# Patient Record
Sex: MALE | Race: WHITE | URBAN - METROPOLITAN AREA
[De-identification: names, ages, dates, MRNs, and addresses within clinical notes are randomized per-mention and may not be internally consistent; named-entity substitution may affect disease eponyms.]

---

## 2023-09-13 RX ORDER — CEFEPIME 1 G/1
2 INJECTION, POWDER, FOR SOLUTION INTRAMUSCULAR; INTRAVENOUS
Refills: 0 | DISCHARGE
Start: 2023-09-13

## 2023-09-20 ENCOUNTER — INPATIENT (INPATIENT)
Facility: HOSPITAL | Age: 73
LOS: 7 days | Discharge: NURSING, REHAB OR LONG-TERM CARE | DRG: 871 | End: 2023-09-28
Attending: INTERNAL MEDICINE | Admitting: INTERNAL MEDICINE
Payer: MEDICARE

## 2023-09-20 VITALS
OXYGEN SATURATION: 99 % | TEMPERATURE: 98 F | SYSTOLIC BLOOD PRESSURE: 99 MMHG | DIASTOLIC BLOOD PRESSURE: 73 MMHG | RESPIRATION RATE: 16 BRPM | HEART RATE: 114 BPM

## 2023-09-20 LAB
ALBUMIN SERPL ELPH-MCNC: 1.7 G/DL — LOW (ref 3.3–5)
ALP SERPL-CCNC: 129 U/L — HIGH (ref 40–120)
ALT FLD-CCNC: 57 U/L — SIGNIFICANT CHANGE UP (ref 12–78)
ANION GAP SERPL CALC-SCNC: 6 MMOL/L — SIGNIFICANT CHANGE UP (ref 5–17)
ANISOCYTOSIS BLD QL: SLIGHT — SIGNIFICANT CHANGE UP
AST SERPL-CCNC: 36 U/L — SIGNIFICANT CHANGE UP (ref 15–37)
BASOPHILS # BLD AUTO: 0 K/UL — SIGNIFICANT CHANGE UP (ref 0–0.2)
BASOPHILS NFR BLD AUTO: 0 % — SIGNIFICANT CHANGE UP (ref 0–2)
BILIRUB SERPL-MCNC: 0.3 MG/DL — SIGNIFICANT CHANGE UP (ref 0.2–1.2)
BUN SERPL-MCNC: 75 MG/DL — HIGH (ref 7–23)
CALCIUM SERPL-MCNC: 7.9 MG/DL — LOW (ref 8.5–10.1)
CHLORIDE SERPL-SCNC: 108 MMOL/L — SIGNIFICANT CHANGE UP (ref 96–108)
CO2 SERPL-SCNC: 22 MMOL/L — SIGNIFICANT CHANGE UP (ref 22–31)
CREAT SERPL-MCNC: 2.53 MG/DL — HIGH (ref 0.5–1.3)
EGFR: 26 ML/MIN/1.73M2 — LOW
EOSINOPHIL # BLD AUTO: 0 K/UL — SIGNIFICANT CHANGE UP (ref 0–0.5)
EOSINOPHIL NFR BLD AUTO: 0 % — SIGNIFICANT CHANGE UP (ref 0–6)
GLUCOSE SERPL-MCNC: 160 MG/DL — HIGH (ref 70–99)
HCT VFR BLD CALC: 28.8 % — LOW (ref 39–50)
HGB BLD-MCNC: 9.1 G/DL — LOW (ref 13–17)
HYPOCHROMIA BLD QL: SLIGHT — SIGNIFICANT CHANGE UP
LACTATE SERPL-SCNC: 1.9 MMOL/L — SIGNIFICANT CHANGE UP (ref 0.7–2)
LIDOCAIN IGE QN: 178 U/L — HIGH (ref 13–75)
LYMPHOCYTES # BLD AUTO: 0.73 K/UL — LOW (ref 1–3.3)
LYMPHOCYTES # BLD AUTO: 5 % — LOW (ref 13–44)
MANUAL SMEAR VERIFICATION: SIGNIFICANT CHANGE UP
MCHC RBC-ENTMCNC: 29.5 PG — SIGNIFICANT CHANGE UP (ref 27–34)
MCHC RBC-ENTMCNC: 31.6 GM/DL — LOW (ref 32–36)
MCV RBC AUTO: 93.5 FL — SIGNIFICANT CHANGE UP (ref 80–100)
METAMYELOCYTES # FLD: 3 % — HIGH (ref 0–0)
MONOCYTES # BLD AUTO: 0.58 K/UL — SIGNIFICANT CHANGE UP (ref 0–0.9)
MONOCYTES NFR BLD AUTO: 4 % — SIGNIFICANT CHANGE UP (ref 2–14)
MYELOCYTES NFR BLD: 6 % — HIGH (ref 0–0)
NEUTROPHILS # BLD AUTO: 11.94 K/UL — HIGH (ref 1.8–7.4)
NEUTROPHILS NFR BLD AUTO: 75 % — SIGNIFICANT CHANGE UP (ref 43–77)
NEUTS BAND # BLD: 7 % — SIGNIFICANT CHANGE UP (ref 0–8)
NRBC # BLD: 0 /100 — SIGNIFICANT CHANGE UP (ref 0–0)
NRBC # BLD: SIGNIFICANT CHANGE UP /100 WBCS (ref 0–0)
PLAT MORPH BLD: NORMAL — SIGNIFICANT CHANGE UP
PLATELET # BLD AUTO: 460 K/UL — HIGH (ref 150–400)
POTASSIUM SERPL-MCNC: 4.5 MMOL/L — SIGNIFICANT CHANGE UP (ref 3.5–5.3)
POTASSIUM SERPL-SCNC: 4.5 MMOL/L — SIGNIFICANT CHANGE UP (ref 3.5–5.3)
PROT SERPL-MCNC: 5.8 GM/DL — LOW (ref 6–8.3)
RBC # BLD: 3.08 M/UL — LOW (ref 4.2–5.8)
RBC # FLD: 15.5 % — HIGH (ref 10.3–14.5)
RBC BLD AUTO: ABNORMAL
SODIUM SERPL-SCNC: 136 MMOL/L — SIGNIFICANT CHANGE UP (ref 135–145)
WBC # BLD: 14.56 K/UL — HIGH (ref 3.8–10.5)
WBC # FLD AUTO: 14.56 K/UL — HIGH (ref 3.8–10.5)

## 2023-09-20 PROCEDURE — 74176 CT ABD & PELVIS W/O CONTRAST: CPT | Mod: 26,ME

## 2023-09-20 PROCEDURE — G1004: CPT

## 2023-09-20 PROCEDURE — 99285 EMERGENCY DEPT VISIT HI MDM: CPT

## 2023-09-20 RX ORDER — SODIUM CHLORIDE 9 MG/ML
1000 INJECTION INTRAMUSCULAR; INTRAVENOUS; SUBCUTANEOUS ONCE
Refills: 0 | Status: COMPLETED | OUTPATIENT
Start: 2023-09-20 | End: 2023-09-20

## 2023-09-20 RX ORDER — FENTANYL CITRATE 50 UG/ML
50 INJECTION INTRAVENOUS ONCE
Refills: 0 | Status: DISCONTINUED | OUTPATIENT
Start: 2023-09-20 | End: 2023-09-20

## 2023-09-20 RX ORDER — CEFEPIME 1 G/1
1000 INJECTION, POWDER, FOR SOLUTION INTRAMUSCULAR; INTRAVENOUS ONCE
Refills: 0 | Status: DISCONTINUED | OUTPATIENT
Start: 2023-09-20 | End: 2023-09-20

## 2023-09-20 RX ORDER — MEROPENEM 1 G/30ML
1000 INJECTION INTRAVENOUS ONCE
Refills: 0 | Status: DISCONTINUED | OUTPATIENT
Start: 2023-09-20 | End: 2023-09-21

## 2023-09-20 RX ADMIN — FENTANYL CITRATE 50 MICROGRAM(S): 50 INJECTION INTRAVENOUS at 19:47

## 2023-09-20 RX ADMIN — SODIUM CHLORIDE 1000 MILLILITER(S): 9 INJECTION INTRAMUSCULAR; INTRAVENOUS; SUBCUTANEOUS at 19:48

## 2023-09-20 NOTE — ED PROVIDER NOTE - NSICDXPASTMEDICALHX_GEN_ALL_CORE_FT
PAST MEDICAL HISTORY:  CVA (cerebrovascular accident)     GERD (gastroesophageal reflux disease)     Hypotension     Metabolic encephalopathy     Rhabdomyolysis

## 2023-09-20 NOTE — ED PROVIDER NOTE - OBJECTIVE STATEMENT
72 y/o male with PMHx of CVA with left-sided hemiparesis and hemiplegia, dysphagia, and facial weakness, hypotension, rhabdomyolysis, and metabolic encephalopathy BIBEMS from [facility] to ED c/o worsening intermittent diffuse abdominal pain x 2 weeks. Pt reports he was discharged from hospital 2 weeks to [facility] rehab and since then has mike having abdominal pain. Pt with LUE midline, on antibiotics for pseudomonas infection and bacteremia since 9/13. Reports fevers at home Tmax of 104F. Had diarrhea initially but it has since improved with meds given. Denies cough. Per review of documents from [facility], pt currently receiving 2g cefepime for bacteremia.

## 2023-09-20 NOTE — ED PROVIDER NOTE - PHYSICAL EXAMINATION
GENERAL: A&Ox4, non-toxic appearing, no acute distress  HEENT: NCAT, EOMI, oral mucosa moist, normal conjunctiva  RESP: CTAB, no respiratory distress, no wheezes/rhonchi/rales, speaking in full sentences  CV: tachycardic, regular rhythm, no murmurs/rubs/gallops  ABDOMEN: soft, diffuse abdominal tenderness, non-distended, no guarding,   : freitas in place   MSK: no visible deformities  NEURO: no focal sensory or motor deficits, CN 2-12 grossly intact  SKIN: warm, normal color, well perfused, no rash  PSYCH: normal affect

## 2023-09-20 NOTE — ED PROVIDER NOTE - PROGRESS NOTE DETAILS
All labs reviewed.  Patient has leukocytosis, CKD, mildly elevated lipase, CT abdomen with mild left hydro with perinephric fat stranding concerning for Parker versus recently passed stone.  Patient also has thickening of the urinary bladder concerning for superimposed cystitis.  Patient has been on cefepime for the past few weeks, will broaden antibiotics to include meropenem.  Patient to be admitted for further management.  Discussed plan with patient and all questions answered.  Hospitalist paged for admission.

## 2023-09-20 NOTE — ED ADULT TRIAGE NOTE - CHIEF COMPLAINT QUOTE
Pt presents to ED c/o abdominal pain, arrives w/ midline to left arm- on antibiotics for pseudomonas and bacteremia started on 9/13. Pt c/o subjective fevers.

## 2023-09-20 NOTE — ED ADULT NURSE NOTE - OBJECTIVE STATEMENT
The patient is a 73y Male complaining of abdominal pain. Pt currently in rehab at [facility]. {Pt has a hx of CVA, pseudomonas infection. Pt denies CP, SOB. 20 g IV inserted labs drawn. Pt safety is maintained.

## 2023-09-20 NOTE — ED PROVIDER NOTE - CLINICAL SUMMARY MEDICAL DECISION MAKING FREE TEXT BOX
74 y/o male presents with diffuse abdominal pain also receiving PICC line IV antibiotics. Will evaluate with bloodwork, Ct abdomen and pelvis, pain control, reassess. 72 y/o male presents with diffuse abdominal pain also receiving PICC line IV antibiotics. Will evaluate with bloodwork, Ct abdomen and pelvis, pain control, reassess.    0007: Alanna MCNEIL: Signout received Dr. Wang pending admission.  Patient admitted for pyelonephritis and ID eval with antibiotics.  Signout given to hospitalist Dr. Garcia.

## 2023-09-20 NOTE — ED ADULT TRIAGE NOTE - NS ED NURSE BANDS TYPE
Name band;
Alert-The patient is alert, awake and responds to voice. The patient is oriented to time, place, and person. The triage nurse is able to obtain subjective information.

## 2023-09-21 DIAGNOSIS — D63.8 ANEMIA IN OTHER CHRONIC DISEASES CLASSIFIED ELSEWHERE: ICD-10-CM

## 2023-09-21 DIAGNOSIS — N17.9 ACUTE KIDNEY FAILURE, UNSPECIFIED: ICD-10-CM

## 2023-09-21 DIAGNOSIS — I63.9 CEREBRAL INFARCTION, UNSPECIFIED: ICD-10-CM

## 2023-09-21 DIAGNOSIS — I95.9 HYPOTENSION, UNSPECIFIED: ICD-10-CM

## 2023-09-21 DIAGNOSIS — R10.9 UNSPECIFIED ABDOMINAL PAIN: ICD-10-CM

## 2023-09-21 DIAGNOSIS — N12 TUBULO-INTERSTITIAL NEPHRITIS, NOT SPECIFIED AS ACUTE OR CHRONIC: ICD-10-CM

## 2023-09-21 DIAGNOSIS — U07.1 COVID-19: ICD-10-CM

## 2023-09-21 LAB
ADD ON TEST-SPECIMEN IN LAB: SIGNIFICANT CHANGE UP
ADD ON TEST-SPECIMEN IN LAB: SIGNIFICANT CHANGE UP
ALBUMIN SERPL ELPH-MCNC: 1.7 G/DL — LOW (ref 3.3–5)
ALP SERPL-CCNC: 114 U/L — SIGNIFICANT CHANGE UP (ref 40–120)
ALT FLD-CCNC: 50 U/L — SIGNIFICANT CHANGE UP (ref 12–78)
ANION GAP SERPL CALC-SCNC: 8 MMOL/L — SIGNIFICANT CHANGE UP (ref 5–17)
APPEARANCE UR: CLEAR — SIGNIFICANT CHANGE UP
APTT BLD: 32.7 SEC — SIGNIFICANT CHANGE UP (ref 24.5–35.6)
AST SERPL-CCNC: 31 U/L — SIGNIFICANT CHANGE UP (ref 15–37)
BACTERIA # UR AUTO: ABNORMAL
BASOPHILS # BLD AUTO: 0.14 K/UL — SIGNIFICANT CHANGE UP (ref 0–0.2)
BASOPHILS NFR BLD AUTO: 1 % — SIGNIFICANT CHANGE UP (ref 0–2)
BILIRUB SERPL-MCNC: 0.3 MG/DL — SIGNIFICANT CHANGE UP (ref 0.2–1.2)
BILIRUB UR-MCNC: NEGATIVE — SIGNIFICANT CHANGE UP
BUN SERPL-MCNC: 62 MG/DL — HIGH (ref 7–23)
CALCIUM SERPL-MCNC: 8.1 MG/DL — LOW (ref 8.5–10.1)
CHLORIDE SERPL-SCNC: 111 MMOL/L — HIGH (ref 96–108)
CK SERPL-CCNC: 21 U/L — LOW (ref 26–308)
CO2 SERPL-SCNC: 22 MMOL/L — SIGNIFICANT CHANGE UP (ref 22–31)
COLOR SPEC: YELLOW — SIGNIFICANT CHANGE UP
COMMENT - URINE: SIGNIFICANT CHANGE UP
CREAT SERPL-MCNC: 2.25 MG/DL — HIGH (ref 0.5–1.3)
CRP SERPL-MCNC: 40 MG/L — HIGH
D DIMER BLD IA.RAPID-MCNC: 621 NG/ML DDU — HIGH
DIFF PNL FLD: ABNORMAL
EGFR: 30 ML/MIN/1.73M2 — LOW
EOSINOPHIL # BLD AUTO: 0.38 K/UL — SIGNIFICANT CHANGE UP (ref 0–0.5)
EOSINOPHIL NFR BLD AUTO: 2.7 % — SIGNIFICANT CHANGE UP (ref 0–6)
EPI CELLS # UR: SIGNIFICANT CHANGE UP
ERYTHROCYTE [SEDIMENTATION RATE] IN BLOOD: 47 MM/HR — HIGH (ref 0–20)
GLUCOSE SERPL-MCNC: 88 MG/DL — SIGNIFICANT CHANGE UP (ref 70–99)
GLUCOSE UR QL: NEGATIVE — SIGNIFICANT CHANGE UP
HCT VFR BLD CALC: 26.7 % — LOW (ref 39–50)
HCV AB S/CO SERPL IA: 0.09 S/CO — SIGNIFICANT CHANGE UP (ref 0–0.99)
HCV AB SERPL-IMP: SIGNIFICANT CHANGE UP
HGB BLD-MCNC: 8.6 G/DL — LOW (ref 13–17)
IMM GRANULOCYTES NFR BLD AUTO: 11.6 % — HIGH (ref 0–0.9)
INR BLD: 1.27 RATIO — HIGH (ref 0.85–1.18)
KETONES UR-MCNC: NEGATIVE — SIGNIFICANT CHANGE UP
LEUKOCYTE ESTERASE UR-ACNC: ABNORMAL
LYMPHOCYTES # BLD AUTO: 1.32 K/UL — SIGNIFICANT CHANGE UP (ref 1–3.3)
LYMPHOCYTES # BLD AUTO: 9.2 % — LOW (ref 13–44)
MAGNESIUM SERPL-MCNC: 1.9 MG/DL — SIGNIFICANT CHANGE UP (ref 1.6–2.6)
MCHC RBC-ENTMCNC: 30.1 PG — SIGNIFICANT CHANGE UP (ref 27–34)
MCHC RBC-ENTMCNC: 32.2 GM/DL — SIGNIFICANT CHANGE UP (ref 32–36)
MCV RBC AUTO: 93.4 FL — SIGNIFICANT CHANGE UP (ref 80–100)
MONOCYTES # BLD AUTO: 0.87 K/UL — SIGNIFICANT CHANGE UP (ref 0–0.9)
MONOCYTES NFR BLD AUTO: 6.1 % — SIGNIFICANT CHANGE UP (ref 2–14)
NEUTROPHILS # BLD AUTO: 9.92 K/UL — HIGH (ref 1.8–7.4)
NEUTROPHILS NFR BLD AUTO: 69.4 % — SIGNIFICANT CHANGE UP (ref 43–77)
NITRITE UR-MCNC: NEGATIVE — SIGNIFICANT CHANGE UP
NT-PROBNP SERPL-SCNC: 1653 PG/ML — HIGH (ref 0–125)
PH UR: 7 — SIGNIFICANT CHANGE UP (ref 5–8)
PHOSPHATE SERPL-MCNC: 3.7 MG/DL — SIGNIFICANT CHANGE UP (ref 2.5–4.5)
PLATELET # BLD AUTO: 418 K/UL — HIGH (ref 150–400)
POTASSIUM SERPL-MCNC: 4 MMOL/L — SIGNIFICANT CHANGE UP (ref 3.5–5.3)
POTASSIUM SERPL-SCNC: 4 MMOL/L — SIGNIFICANT CHANGE UP (ref 3.5–5.3)
PROT SERPL-MCNC: 5.8 GM/DL — LOW (ref 6–8.3)
PROT UR-MCNC: 30 MG/DL
PROTHROM AB SERPL-ACNC: 14.2 SEC — HIGH (ref 9.5–13)
RAPID RVP RESULT: DETECTED
RBC # BLD: 2.86 M/UL — LOW (ref 4.2–5.8)
RBC # FLD: 15.7 % — HIGH (ref 10.3–14.5)
RBC CASTS # UR COMP ASSIST: SIGNIFICANT CHANGE UP /HPF (ref 0–4)
SARS-COV-2 RNA SPEC QL NAA+PROBE: DETECTED
SODIUM SERPL-SCNC: 141 MMOL/L — SIGNIFICANT CHANGE UP (ref 135–145)
SP GR SPEC: 1 — LOW (ref 1.01–1.02)
T4 FREE SERPL-MCNC: 0.65 NG/DL — LOW (ref 0.76–1.46)
TROPONIN I, HIGH SENSITIVITY RESULT: 11.6 NG/L — SIGNIFICANT CHANGE UP
TSH SERPL-MCNC: 10.7 UU/ML — HIGH (ref 0.34–4.82)
UROBILINOGEN FLD QL: NEGATIVE — SIGNIFICANT CHANGE UP
WBC # BLD: 14.29 K/UL — HIGH (ref 3.8–10.5)
WBC # FLD AUTO: 14.29 K/UL — HIGH (ref 3.8–10.5)
WBC UR QL: ABNORMAL /HPF (ref 0–5)

## 2023-09-21 PROCEDURE — 97163 PT EVAL HIGH COMPLEX 45 MIN: CPT | Mod: GP

## 2023-09-21 PROCEDURE — 85652 RBC SED RATE AUTOMATED: CPT

## 2023-09-21 PROCEDURE — 84145 PROCALCITONIN (PCT): CPT

## 2023-09-21 PROCEDURE — 80076 HEPATIC FUNCTION PANEL: CPT

## 2023-09-21 PROCEDURE — 99232 SBSQ HOSP IP/OBS MODERATE 35: CPT

## 2023-09-21 PROCEDURE — 82803 BLOOD GASES ANY COMBINATION: CPT

## 2023-09-21 PROCEDURE — 85730 THROMBOPLASTIN TIME PARTIAL: CPT

## 2023-09-21 PROCEDURE — 80061 LIPID PANEL: CPT

## 2023-09-21 PROCEDURE — 82607 VITAMIN B-12: CPT

## 2023-09-21 PROCEDURE — 85027 COMPLETE CBC AUTOMATED: CPT

## 2023-09-21 PROCEDURE — 93970 EXTREMITY STUDY: CPT

## 2023-09-21 PROCEDURE — C1751: CPT

## 2023-09-21 PROCEDURE — 71045 X-RAY EXAM CHEST 1 VIEW: CPT | Mod: 26

## 2023-09-21 PROCEDURE — 84439 ASSAY OF FREE THYROXINE: CPT

## 2023-09-21 PROCEDURE — 84443 ASSAY THYROID STIM HORMONE: CPT

## 2023-09-21 PROCEDURE — 93010 ELECTROCARDIOGRAM REPORT: CPT

## 2023-09-21 PROCEDURE — 86140 C-REACTIVE PROTEIN: CPT

## 2023-09-21 PROCEDURE — 84100 ASSAY OF PHOSPHORUS: CPT

## 2023-09-21 PROCEDURE — 80053 COMPREHEN METABOLIC PANEL: CPT

## 2023-09-21 PROCEDURE — 36415 COLL VENOUS BLD VENIPUNCTURE: CPT

## 2023-09-21 PROCEDURE — 83880 ASSAY OF NATRIURETIC PEPTIDE: CPT

## 2023-09-21 PROCEDURE — 82728 ASSAY OF FERRITIN: CPT

## 2023-09-21 PROCEDURE — 97116 GAIT TRAINING THERAPY: CPT | Mod: GP

## 2023-09-21 PROCEDURE — 85025 COMPLETE CBC W/AUTO DIFF WBC: CPT

## 2023-09-21 PROCEDURE — 93970 EXTREMITY STUDY: CPT | Mod: 26

## 2023-09-21 PROCEDURE — 93005 ELECTROCARDIOGRAM TRACING: CPT

## 2023-09-21 PROCEDURE — 85379 FIBRIN DEGRADATION QUANT: CPT

## 2023-09-21 PROCEDURE — 86803 HEPATITIS C AB TEST: CPT

## 2023-09-21 PROCEDURE — 93306 TTE W/DOPPLER COMPLETE: CPT

## 2023-09-21 PROCEDURE — 82306 VITAMIN D 25 HYDROXY: CPT

## 2023-09-21 PROCEDURE — 82746 ASSAY OF FOLIC ACID SERUM: CPT

## 2023-09-21 PROCEDURE — 80048 BASIC METABOLIC PNL TOTAL CA: CPT

## 2023-09-21 PROCEDURE — 97530 THERAPEUTIC ACTIVITIES: CPT | Mod: GP

## 2023-09-21 PROCEDURE — 85610 PROTHROMBIN TIME: CPT

## 2023-09-21 PROCEDURE — 84484 ASSAY OF TROPONIN QUANT: CPT

## 2023-09-21 PROCEDURE — 71045 X-RAY EXAM CHEST 1 VIEW: CPT

## 2023-09-21 PROCEDURE — 83735 ASSAY OF MAGNESIUM: CPT

## 2023-09-21 PROCEDURE — 82550 ASSAY OF CK (CPK): CPT

## 2023-09-21 RX ORDER — SODIUM BICARBONATE 1 MEQ/ML
2 SYRINGE (ML) INTRAVENOUS
Refills: 0 | DISCHARGE

## 2023-09-21 RX ORDER — SODIUM CHLORIDE 9 MG/ML
1000 INJECTION, SOLUTION INTRAVENOUS
Refills: 0 | Status: DISCONTINUED | OUTPATIENT
Start: 2023-09-21 | End: 2023-09-22

## 2023-09-21 RX ORDER — ASPIRIN/CALCIUM CARB/MAGNESIUM 324 MG
81 TABLET ORAL DAILY
Refills: 0 | Status: DISCONTINUED | OUTPATIENT
Start: 2023-09-21 | End: 2023-09-28

## 2023-09-21 RX ORDER — POLYETHYLENE GLYCOL 3350 17 G/17G
17 POWDER, FOR SOLUTION ORAL ONCE
Refills: 0 | Status: COMPLETED | OUTPATIENT
Start: 2023-09-21 | End: 2023-09-21

## 2023-09-21 RX ORDER — LEVOTHYROXINE SODIUM 125 MCG
50 TABLET ORAL DAILY
Refills: 0 | Status: DISCONTINUED | OUTPATIENT
Start: 2023-09-21 | End: 2023-09-28

## 2023-09-21 RX ORDER — MAGNESIUM OXIDE 400 MG ORAL TABLET 241.3 MG
1 TABLET ORAL
Refills: 0 | DISCHARGE

## 2023-09-21 RX ORDER — ACETAMINOPHEN 500 MG
650 TABLET ORAL EVERY 6 HOURS
Refills: 0 | Status: DISCONTINUED | OUTPATIENT
Start: 2023-09-21 | End: 2023-09-28

## 2023-09-21 RX ORDER — SERTRALINE 25 MG/1
50 TABLET, FILM COATED ORAL DAILY
Refills: 0 | Status: DISCONTINUED | OUTPATIENT
Start: 2023-09-21 | End: 2023-09-28

## 2023-09-21 RX ORDER — HEPARIN SODIUM 5000 [USP'U]/ML
5500 INJECTION INTRAVENOUS; SUBCUTANEOUS ONCE
Refills: 0 | Status: COMPLETED | OUTPATIENT
Start: 2023-09-21 | End: 2023-09-21

## 2023-09-21 RX ORDER — PANTOPRAZOLE SODIUM 20 MG/1
40 TABLET, DELAYED RELEASE ORAL
Refills: 0 | Status: DISCONTINUED | OUTPATIENT
Start: 2023-09-21 | End: 2023-09-28

## 2023-09-21 RX ORDER — COLLAGENASE CLOSTRIDIUM HIST. 250 UNIT/G
1 OINTMENT (GRAM) TOPICAL DAILY
Refills: 0 | Status: DISCONTINUED | OUTPATIENT
Start: 2023-09-21 | End: 2023-09-21

## 2023-09-21 RX ORDER — COLLAGENASE CLOSTRIDIUM HIST. 250 UNIT/G
1 OINTMENT (GRAM) TOPICAL THREE TIMES A DAY
Refills: 0 | Status: DISCONTINUED | OUTPATIENT
Start: 2023-09-21 | End: 2023-09-26

## 2023-09-21 RX ORDER — MAGNESIUM OXIDE 400 MG ORAL TABLET 241.3 MG
400 TABLET ORAL DAILY
Refills: 0 | Status: DISCONTINUED | OUTPATIENT
Start: 2023-09-21 | End: 2023-09-22

## 2023-09-21 RX ORDER — ACETAMINOPHEN 500 MG
650 TABLET ORAL EVERY 8 HOURS
Refills: 0 | Status: DISCONTINUED | OUTPATIENT
Start: 2023-09-21 | End: 2023-09-28

## 2023-09-21 RX ORDER — CEFEPIME 1 G/1
1000 INJECTION, POWDER, FOR SOLUTION INTRAMUSCULAR; INTRAVENOUS EVERY 12 HOURS
Refills: 0 | Status: DISCONTINUED | OUTPATIENT
Start: 2023-09-21 | End: 2023-09-28

## 2023-09-21 RX ORDER — SERTRALINE 25 MG/1
1 TABLET, FILM COATED ORAL
Refills: 0 | DISCHARGE

## 2023-09-21 RX ORDER — ASPIRIN/CALCIUM CARB/MAGNESIUM 324 MG
1 TABLET ORAL
Refills: 0 | DISCHARGE

## 2023-09-21 RX ORDER — SODIUM BICARBONATE 1 MEQ/ML
650 SYRINGE (ML) INTRAVENOUS
Refills: 0 | Status: DISCONTINUED | OUTPATIENT
Start: 2023-09-21 | End: 2023-09-23

## 2023-09-21 RX ORDER — MEROPENEM 1 G/30ML
1000 INJECTION INTRAVENOUS ONCE
Refills: 0 | Status: COMPLETED | OUTPATIENT
Start: 2023-09-21 | End: 2023-09-21

## 2023-09-21 RX ORDER — ACETAMINOPHEN 500 MG
650 TABLET ORAL EVERY 6 HOURS
Refills: 0 | Status: DISCONTINUED | OUTPATIENT
Start: 2023-09-21 | End: 2023-09-21

## 2023-09-21 RX ORDER — ACETAMINOPHEN 500 MG
2 TABLET ORAL
Refills: 0 | DISCHARGE

## 2023-09-21 RX ORDER — COLLAGENASE CLOSTRIDIUM HIST. 250 UNIT/G
1 OINTMENT (GRAM) TOPICAL
Refills: 0 | DISCHARGE

## 2023-09-21 RX ORDER — MIDODRINE HYDROCHLORIDE 2.5 MG/1
5 TABLET ORAL EVERY 8 HOURS
Refills: 0 | Status: DISCONTINUED | OUTPATIENT
Start: 2023-09-21 | End: 2023-09-28

## 2023-09-21 RX ORDER — LANOLIN ALCOHOL/MO/W.PET/CERES
3 CREAM (GRAM) TOPICAL AT BEDTIME
Refills: 0 | Status: DISCONTINUED | OUTPATIENT
Start: 2023-09-21 | End: 2023-09-28

## 2023-09-21 RX ORDER — PANTOPRAZOLE SODIUM 20 MG/1
1 TABLET, DELAYED RELEASE ORAL
Refills: 0 | DISCHARGE

## 2023-09-21 RX ORDER — HEPARIN SODIUM 5000 [USP'U]/ML
5000 INJECTION INTRAVENOUS; SUBCUTANEOUS EVERY 8 HOURS
Refills: 0 | Status: DISCONTINUED | OUTPATIENT
Start: 2023-09-21 | End: 2023-09-21

## 2023-09-21 RX ORDER — MIDODRINE HYDROCHLORIDE 2.5 MG/1
1 TABLET ORAL
Refills: 0 | DISCHARGE

## 2023-09-21 RX ORDER — ONDANSETRON 8 MG/1
4 TABLET, FILM COATED ORAL EVERY 8 HOURS
Refills: 0 | Status: DISCONTINUED | OUTPATIENT
Start: 2023-09-21 | End: 2023-09-28

## 2023-09-21 RX ORDER — HEPARIN SODIUM 5000 [USP'U]/ML
5500 INJECTION INTRAVENOUS; SUBCUTANEOUS EVERY 6 HOURS
Refills: 0 | Status: DISCONTINUED | OUTPATIENT
Start: 2023-09-21 | End: 2023-09-25

## 2023-09-21 RX ORDER — HEPARIN SODIUM 5000 [USP'U]/ML
2500 INJECTION INTRAVENOUS; SUBCUTANEOUS EVERY 6 HOURS
Refills: 0 | Status: DISCONTINUED | OUTPATIENT
Start: 2023-09-21 | End: 2023-09-25

## 2023-09-21 RX ORDER — HEPARIN SODIUM 5000 [USP'U]/ML
INJECTION INTRAVENOUS; SUBCUTANEOUS
Qty: 25000 | Refills: 0 | Status: DISCONTINUED | OUTPATIENT
Start: 2023-09-21 | End: 2023-09-25

## 2023-09-21 RX ORDER — CEFEPIME 1 G/1
1000 INJECTION, POWDER, FOR SOLUTION INTRAMUSCULAR; INTRAVENOUS EVERY 12 HOURS
Refills: 0 | Status: DISCONTINUED | OUTPATIENT
Start: 2023-09-21 | End: 2023-09-21

## 2023-09-21 RX ADMIN — Medication 1 APPLICATION(S): at 15:00

## 2023-09-21 RX ADMIN — Medication 650 MILLIGRAM(S): at 22:53

## 2023-09-21 RX ADMIN — Medication 650 MILLIGRAM(S): at 10:59

## 2023-09-21 RX ADMIN — SODIUM CHLORIDE 75 MILLILITER(S): 9 INJECTION, SOLUTION INTRAVENOUS at 23:03

## 2023-09-21 RX ADMIN — Medication 81 MILLIGRAM(S): at 11:00

## 2023-09-21 RX ADMIN — Medication 650 MILLIGRAM(S): at 23:23

## 2023-09-21 RX ADMIN — SERTRALINE 50 MILLIGRAM(S): 25 TABLET, FILM COATED ORAL at 10:59

## 2023-09-21 RX ADMIN — HEPARIN SODIUM 5000 UNIT(S): 5000 INJECTION INTRAVENOUS; SUBCUTANEOUS at 14:12

## 2023-09-21 RX ADMIN — POLYETHYLENE GLYCOL 3350 17 GRAM(S): 17 POWDER, FOR SOLUTION ORAL at 23:02

## 2023-09-21 RX ADMIN — Medication 650 MILLIGRAM(S): at 17:33

## 2023-09-21 RX ADMIN — Medication 650 MILLIGRAM(S): at 11:01

## 2023-09-21 RX ADMIN — MEROPENEM 1000 MILLIGRAM(S): 1 INJECTION INTRAVENOUS at 00:44

## 2023-09-21 RX ADMIN — HEPARIN SODIUM 5500 UNIT(S): 5000 INJECTION INTRAVENOUS; SUBCUTANEOUS at 22:53

## 2023-09-21 RX ADMIN — MAGNESIUM OXIDE 400 MG ORAL TABLET 400 MILLIGRAM(S): 241.3 TABLET ORAL at 11:00

## 2023-09-21 RX ADMIN — HEPARIN SODIUM 1200 UNIT(S)/HR: 5000 INJECTION INTRAVENOUS; SUBCUTANEOUS at 22:54

## 2023-09-21 RX ADMIN — CEFEPIME 1000 MILLIGRAM(S): 1 INJECTION, POWDER, FOR SOLUTION INTRAMUSCULAR; INTRAVENOUS at 22:53

## 2023-09-21 RX ADMIN — Medication 30 MILLILITER(S): at 17:33

## 2023-09-21 RX ADMIN — MIDODRINE HYDROCHLORIDE 5 MILLIGRAM(S): 2.5 TABLET ORAL at 14:12

## 2023-09-21 RX ADMIN — MIDODRINE HYDROCHLORIDE 5 MILLIGRAM(S): 2.5 TABLET ORAL at 23:04

## 2023-09-21 RX ADMIN — Medication 650 MILLIGRAM(S): at 11:31

## 2023-09-21 NOTE — DIETITIAN INITIAL EVALUATION ADULT - PROBLEM SELECTOR PLAN 1
- recent history of bacteriami -  being treated for pseudomonas bacterimia  (MIDLINE L arm)  - this could be secondary to cystitis /  pyelo  - Started on IV antibiotics  - close follow up with Dr. Wilson

## 2023-09-21 NOTE — CHART NOTE - NSCHARTNOTEFT_GEN_A_CORE
Call received from radiologist regarding LE doppler, positive for BLE DVT. Stat PTT ordered and heparin gtt initiated communicated with nurse on unit. Hospitalist to evaluate patient for high risk VTE. Partially impaired: cannot see medication labels or newsprint, but can see obstacles in path, and the surrounding layout; can count fingers at arm's length

## 2023-09-21 NOTE — H&P ADULT - NSHPLABSRESULTS_GEN_ALL_CORE
CBC Full  -  ( 20 Sep 2023 18:40 )  WBC Count : 14.56 K/uL  RBC Count : 3.08 M/uL  Hemoglobin : 9.1 g/dL  Hematocrit : 28.8 %  Platelet Count - Automated : 460 K/uL    Urinalysis Basic - ( 20 Sep 2023 23:51 )    Color: Yellow / Appearance: Clear / S.005 / pH: x  Gluc: x / Ketone: Negative  / Bili: Negative / Urobili: Negative   Blood: x / Protein: 30 mg/dL / Nitrite: Negative   Leuk Esterase: Small / RBC: 0-2 /HPF / WBC 26-50 /HPF   Sq Epi: x / Non Sq Epi: x / Bacteria: Moderate      136  |  108  |  75<H>  ----------------------------<  160<H>  4.5   |  22  |  2.53<H>    Ca    7.9<L>      20 Sep 2023 18:40    TPro  5.8<L>  /  Alb  1.7<L>  /  TBili  0.3  /  DBili  x   /  AST  36  /  ALT  57  /  AlkPhos  129<H>

## 2023-09-21 NOTE — DIETITIAN INITIAL EVALUATION ADULT - ORAL INTAKE PTA/DIET HISTORY
Pt lives at home alone, was at [facility] x 2 weeks prior to hosp admission. Reports decreased appetite and poor PO intake x 1 week 2/2 early satiety, abd pain, weakness

## 2023-09-21 NOTE — H&P ADULT - NSHPPHYSICALEXAM_GEN_ALL_CORE
Physical Exam:   GENERAL APPEARANCE:  NAD, hemodynamically stable  T(C): 37.1 (09-21-23 @ 03:58), Max: 37.1 (09-21-23 @ 03:58)  HR: 91 (09-21-23 @ 03:58) (82 - 114)  BP: 139/448 (09-21-23 @ 03:58) (99/73 - 139/448)  RR: 18 (09-21-23 @ 03:58) (16 - 18)  SpO2: 100% (09-21-23 @ 03:58) (99% - 100%)  Wt(kg): --  HEENT:  Head is normocephalic    Skin:  Warm and dry without any rash   NECK:  Supple without lymphadenopathy.   HEART:  Regular rate and rhythm. normal S1 and S2, No M/R/G  LUNGS:  Good ins/exp effort, no W/R/R/C  ABDOMEN:  Soft, nontender, nondistended with good bowel sounds heard  EXTREMITIES:  Without cyanosis, clubbing or edema.   NEUROLOGICAL:  Gross nonfocal Physical Exam:   GENERAL APPEARANCE:  frail, deconditioned, chronically sick appearing  T(C): 37.1 (09-21-23 @ 03:58), Max: 37.1 (09-21-23 @ 03:58)  HR: 91 (09-21-23 @ 03:58) (82 - 114)  BP: 139/448 (09-21-23 @ 03:58) (99/73 - 139/448)  RR: 18 (09-21-23 @ 03:58) (16 - 18)  SpO2: 100% (09-21-23 @ 03:58) (99% - 100%)  Wt(kg): --  HEENT: temporal muscle wasting  Skin:  thin and dry  NECK: muscle wasting  HEART:  Regular rate and rhythm. normal S1 and S2, No M/R/G  LUNGS:  Good ins/exp effort, no W/R/R/C  : patient with chronic freitas  ABDOMEN:  Soft, nontender, nondistended with good bowel sounds heard  EXTREMITIES:  (L) sided midline (side appears to be clean)  NEUROLOGICAL:  Hx of CVA with residual deficits

## 2023-09-21 NOTE — CONSULT NOTE ADULT - SUBJECTIVE AND OBJECTIVE BOX
HPI:  Patient is a 72 y/o/m with pmhx of  PMHx of CVA (with left-sided hemiparesis and hemiplegia), dysphagia, and facial weakness, hypotension, rhabdomyolysis, and metabolic encephalopathy BIBEMS from [facility] to ED c/o worsening intermittent diffuse abdominal pain x 2 weeks. Pt reports [facility] and since then has mike having abdominal pain. Pt with LUE midline, on antibiotics for pseudomonas infection and bacteremia since 9/13. Reports fevers at home Tmax of 104F. Had diarrhea initially but it has since improved with meds given. Denies cough.  Patient recently has been receiving IV cefepime for pseudomonas bacteremia. noted with urine cx, blood cx growing PSAE 9/11 and blood cx positive again 9/14. UA positive, imaging remarkable for Mild left hydronephrosis with perinephric fat stranding and urothelial thickening. These findings are nonspecific but can be seen in the setting of pyelonephritis or recently passed stone. Mild circumferential mural thickening of the urinary bladder, likely related to chronic outlet obstruction.       PMH: as above  PSH: as above    Meds: per reconciliation sheet, noted below  MEDICATIONS  (STANDING):  acetaminophen     Tablet .. 650 milliGRAM(s) Oral every 6 hours  aspirin enteric coated 81 milliGRAM(s) Oral daily  cefepime   IVPB 1000 milliGRAM(s) IV Intermittent every 12 hours  collagenase Ointment 1 Application(s) Topical three times a day  magnesium oxide 400 milliGRAM(s) Oral daily  midodrine 5 milliGRAM(s) Oral every 8 hours  pantoprazole    Tablet 40 milliGRAM(s) Oral before breakfast  sertraline 50 milliGRAM(s) Oral daily  sodium bicarbonate 650 milliGRAM(s) Oral two times a day    Allergies    No Known Allergies    Intolerances      Social: no smoking, no alcohol, no illegal drugs; no recent travel, no exposure to TB  FAMILY HISTORY:     no history of premature cardiovascular disease in first degree relatives    ROS: the patient denies fever, no chills, no HA, no dizziness, no sore throat, no blurry vision, no CP, no palpitations, no SOB, no cough, no abdominal pain, no diarrhea, no N/V, no dysuria, no leg pain, no claudication, no rash, no joint aches, no rectal pain or bleeding, no night sweats    All other systems reviewed and are negative    Vital Signs Last 24 Hrs  T(C): 36.8 (21 Sep 2023 09:45), Max: 37.1 (21 Sep 2023 03:58)  T(F): 98.2 (21 Sep 2023 09:45), Max: 98.7 (21 Sep 2023 03:58)  HR: 89 (21 Sep 2023 09:45) (82 - 114)  BP: 134/86 (21 Sep 2023 09:45) (99/73 - 139/48)  BP(mean): 89 (20 Sep 2023 22:55) (89 - 89)  RR: 18 (21 Sep 2023 09:45) (16 - 18)  SpO2: 100% (21 Sep 2023 09:45) (99% - 100%)    Parameters below as of 20 Sep 2023 18:06  Patient On (Oxygen Delivery Method): room air      Daily     Daily     PE:  Constitutional: NAD   HEENT: NC/AT, EOMI, PERRLA, conjunctivae clear; ears and nose atraumatic; pharynx benign  Neck: supple; thyroid not palpable  Back: no tenderness  Respiratory: respiratory effort normal; clear to auscultation  Cardiovascular: S1S2 regular, no murmurs  Abdomen: soft, not tender, not distended, positive BS; liver and spleen WNL  Genitourinary: no suprapubic tenderness  Lymphatic: no LN palpable  Musculoskeletal: no muscle tenderness, no joint swelling or tenderness  Extremities: no pedal edema  Neurological/ Psychiatric: AxOx3, Judgement and insight normal;  moving all extremities  Skin: no rashes; no palpable lesions    Labs: all available labs reviewed                        8.6    14.29 )-----------( 418      ( 21 Sep 2023 10:11 )             26.7     09-21    141  |  111<H>  |  62<H>  ----------------------------<  88  4.0   |  22  |  2.25<H>    Ca    8.1<L>      21 Sep 2023 10:11  Phos  3.7     09-21  Mg     1.9     09-21    TPro  5.8<L>  /  Alb  1.7<L>  /  TBili  0.3  /  DBili  x   /  AST  31  /  ALT  50  /  AlkPhos  114  09-21     LIVER FUNCTIONS - ( 21 Sep 2023 10:11 )  Alb: 1.7 g/dL / Pro: 5.8 gm/dL / ALK PHOS: 114 U/L / ALT: 50 U/L / AST: 31 U/L / GGT: x           Urinalysis Basic - ( 21 Sep 2023 10:11 )    Color: x / Appearance: x / SG: x / pH: x  Gluc: 88 mg/dL / Ketone: x  / Bili: x / Urobili: x   Blood: x / Protein: x / Nitrite: x   Leuk Esterase: x / RBC: x / WBC x   Sq Epi: x / Non Sq Epi: x / Bacteria: x    9/11, 9/14 blood cx growing PSAE; urine cx PSAE       Radiology: all available radiological tests reviewed  < from: CT Abdomen and Pelvis No Cont (09.20.23 @ 21:40) >    ACC: [id number] EXAM:  CT ABDOMEN AND PELVIS   ORDERED BY: TOBI LAWS     PROCEDURE DATE:  09/20/2023          INTERPRETATION:  CLINICAL INFORMATION: Abdominal pain    COMPARISON: None.    CONTRAST/COMPLICATIONS:  IV Contrast: NONE  Oral Contrast: NONE  Complications: None reported at time of study completion    PROCEDURE:  CT of the Abdomen and Pelvis was performed.  Sagittal and coronal reformats were performed.    FINDINGS:  LOWER CHEST: Small bilateral pleural effusions with associated   atelectasis. Small pericardial effusion. Coronary artery calcifications.   Moderate hiatal hernia.    LIVER: Within normal limits.  BILE DUCTS: Normal caliber.  GALLBLADDER: Mural calcification of the gallbladder. Cholelithiasis   without evidence of acute cholecystitis..  SPLEEN: Within normal limits.  PANCREAS: Within normal limits.  ADRENALS: Within normal limits.  KIDNEYS/URETERS: Mild left hydronephrosis with perinephric fat stranding   and urothelial thickening    BLADDER: Mild circumferentialmural thickening of the urinary bladder.   Catheter in place.  REPRODUCTIVE ORGANS: Prostate is enlarged.    BOWEL: No bowel obstruction. Appendix is not visualized. No evidence of   inflammation in the pericecal region. Moderate colonic stool burden.  PERITONEUM: No ascites.  VESSELS: Atherosclerotic changes.  RETROPERITONEUM/LYMPH NODES: No lymphadenopathy.  ABDOMINAL WALL: Diffuse body wall edema.  BONES: No acute osseous abnormality. Degenerative changes in the spine.    IMPRESSION:  Mild left hydronephrosis with perinephric fat stranding and urothelial   thickening. These findings are nonspecific but can be seen in the setting   of pyelonephritis or recently passed stone.    Mild circumferential mural thickening of the urinary bladder, likely   related to chronic outlet obstruction, however superimposed cystitis is   not excluded in the current context.    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    Small bilateral pleural effusions with associated atelectasis.    < end of copied text >    Advanced directives addressed: full resuscitation   HPI:  Patient is a 74 y/o/m with pmhx of  PMHx of CVA (with left-sided hemiparesis and hemiplegia), dysphagia, and facial weakness, hypotension, rhabdomyolysis, and metabolic encephalopathy BIBEMS from [facility] to ED c/o worsening intermittent diffuse abdominal pain x 2 weeks. Pt [facility] and since then has mike having abdominal pain. Pt with LUE midline, on antibiotics for pseudomonas infection and bacteremia since 9/13. Reports fevers at home Tmax of 104F. Had diarrhea initially but it has since improved with meds given. Denies cough.  Patient recently has been receiving IV cefepime for pseudomonas bacteremia. noted with urine cx, blood cx growing PSAE 9/11 and blood cx positive again 9/14. UA positive, imaging remarkable for Mild left hydronephrosis with perinephric fat stranding and urothelial thickening. These findings are nonspecific but can be seen in the setting of pyelonephritis or recently passed stone. Mild circumferential mural thickening of the urinary bladder, likely related to chronic outlet obstruction.       PMH: as above  PSH: as above    Meds: per reconciliation sheet, noted below  MEDICATIONS  (STANDING):  acetaminophen     Tablet .. 650 milliGRAM(s) Oral every 6 hours  aspirin enteric coated 81 milliGRAM(s) Oral daily  cefepime   IVPB 1000 milliGRAM(s) IV Intermittent every 12 hours  collagenase Ointment 1 Application(s) Topical three times a day  magnesium oxide 400 milliGRAM(s) Oral daily  midodrine 5 milliGRAM(s) Oral every 8 hours  pantoprazole    Tablet 40 milliGRAM(s) Oral before breakfast  sertraline 50 milliGRAM(s) Oral daily  sodium bicarbonate 650 milliGRAM(s) Oral two times a day    Allergies    No Known Allergies    Intolerances      Social: no smoking, no alcohol, no illegal drugs; no recent travel, no exposure to TB  FAMILY HISTORY:     no history of premature cardiovascular disease in first degree relatives    ROS: + fever, chills, no HA, no dizziness, no sore throat, no blurry vision, no CP, no palpitations, no SOB, no cough, no abdominal pain, no diarrhea, no N/V, + dysuria, no leg pain, no claudication, no rash, no joint aches, no rectal pain or bleeding, no night sweats    All other systems reviewed and are negative    Vital Signs Last 24 Hrs  T(C): 36.8 (21 Sep 2023 09:45), Max: 37.1 (21 Sep 2023 03:58)  T(F): 98.2 (21 Sep 2023 09:45), Max: 98.7 (21 Sep 2023 03:58)  HR: 89 (21 Sep 2023 09:45) (82 - 114)  BP: 134/86 (21 Sep 2023 09:45) (99/73 - 139/48)  BP(mean): 89 (20 Sep 2023 22:55) (89 - 89)  RR: 18 (21 Sep 2023 09:45) (16 - 18)  SpO2: 100% (21 Sep 2023 09:45) (99% - 100%)    Parameters below as of 20 Sep 2023 18:06  Patient On (Oxygen Delivery Method): room air      Daily     Daily     PE:  Constitutional: NAD   HEENT: NC/AT, EOMI, PERRLA, conjunctivae clear; ears and nose atraumatic; pharynx benign  Neck: supple; thyroid not palpable  Back: no tenderness  Respiratory: respiratory effort normal; clear to auscultation  Cardiovascular: S1S2 regular, no murmurs  Abdomen: soft, not tender, not distended, positive BS; liver and spleen WNL  Genitourinary: no suprapubic tenderness  Lymphatic: no LN palpable  Musculoskeletal: no muscle tenderness, no joint swelling or tenderness  Extremities: no pedal edema  Neurological/ Psychiatric: AxOx3, Judgement and insight normal;  moving all extremities  Skin: no rashes; no palpable lesions    Labs: all available labs reviewed                        8.6    14.29 )-----------( 418      ( 21 Sep 2023 10:11 )             26.7     09-21    141  |  111<H>  |  62<H>  ----------------------------<  88  4.0   |  22  |  2.25<H>    Ca    8.1<L>      21 Sep 2023 10:11  Phos  3.7     09-21  Mg     1.9     09-21    TPro  5.8<L>  /  Alb  1.7<L>  /  TBili  0.3  /  DBili  x   /  AST  31  /  ALT  50  /  AlkPhos  114  09-21     LIVER FUNCTIONS - ( 21 Sep 2023 10:11 )  Alb: 1.7 g/dL / Pro: 5.8 gm/dL / ALK PHOS: 114 U/L / ALT: 50 U/L / AST: 31 U/L / GGT: x           Urinalysis Basic - ( 21 Sep 2023 10:11 )    Color: x / Appearance: x / SG: x / pH: x  Gluc: 88 mg/dL / Ketone: x  / Bili: x / Urobili: x   Blood: x / Protein: x / Nitrite: x   Leuk Esterase: x / RBC: x / WBC x   Sq Epi: x / Non Sq Epi: x / Bacteria: x    9/11, 9/14 blood cx growing PSAE; urine cx PSAE       Radiology: all available radiological tests reviewed  < from: CT Abdomen and Pelvis No Cont (09.20.23 @ 21:40) >    ACC: [id number] EXAM:  CT ABDOMEN AND PELVIS   ORDERED BY: TOBI LAWS     PROCEDURE DATE:  09/20/2023          INTERPRETATION:  CLINICAL INFORMATION: Abdominal pain    COMPARISON: None.    CONTRAST/COMPLICATIONS:  IV Contrast: NONE  Oral Contrast: NONE  Complications: None reported at time of study completion    PROCEDURE:  CT of the Abdomen and Pelvis was performed.  Sagittal and coronal reformats were performed.    FINDINGS:  LOWER CHEST: Small bilateral pleural effusions with associated   atelectasis. Small pericardial effusion. Coronary artery calcifications.   Moderate hiatal hernia.    LIVER: Within normal limits.  BILE DUCTS: Normal caliber.  GALLBLADDER: Mural calcification of the gallbladder. Cholelithiasis   without evidence of acute cholecystitis..  SPLEEN: Within normal limits.  PANCREAS: Within normal limits.  ADRENALS: Within normal limits.  KIDNEYS/URETERS: Mild left hydronephrosis with perinephric fat stranding   and urothelial thickening    BLADDER: Mild circumferentialmural thickening of the urinary bladder.   Catheter in place.  REPRODUCTIVE ORGANS: Prostate is enlarged.    BOWEL: No bowel obstruction. Appendix is not visualized. No evidence of   inflammation in the pericecal region. Moderate colonic stool burden.  PERITONEUM: No ascites.  VESSELS: Atherosclerotic changes.  RETROPERITONEUM/LYMPH NODES: No lymphadenopathy.  ABDOMINAL WALL: Diffuse body wall edema.  BONES: No acute osseous abnormality. Degenerative changes in the spine.    IMPRESSION:  Mild left hydronephrosis with perinephric fat stranding and urothelial   thickening. These findings are nonspecific but can be seen in the setting   of pyelonephritis or recently passed stone.    Mild circumferential mural thickening of the urinary bladder, likely   related to chronic outlet obstruction, however superimposed cystitis is   not excluded in the current context.    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    Small bilateral pleural effusions with associated atelectasis.    < end of copied text >    Advanced directives addressed: full resuscitation

## 2023-09-21 NOTE — DIETITIAN INITIAL EVALUATION ADULT - OTHER INFO
Patient is a 74 y/o/m with pmhx of  PMHx of CVA (with left-sided hemiparesis and hemiplegia), dysphagia, and facial weakness, hypotension, rhabdomyolysis, and metabolic encephalopathy BIBEMS from [facility] to ED c/o worsening intermittent diffuse abdominal pain x 2 weeks. Pt reports he was discharged from hospital 2 weeks to [facility] rehab and since then has mike having abdominal pain.  Admit for COVID+, abd pain, sepsis with PSAE, ? passed stone, HIRA    Reports improved appetite when admitted today; RD observed breakfast tray and pt consumed 100% (eggs, Colombian toast, cantaloupe). Reports UBW of 170# late July 2023 (x 2 months ago) ; bed scale wt of 135# taken by RD on 9/21/23 - pt endorses wt loss s/p stroke in August. Unintentional wt loss of 35# / 20.6% wt loss x 2 mo; severe & clinically significant. NFPE reveals severe muscle/ fat wasting - pt appears thin, tawnya, and frail. C/w regular diet as tolerated ; add ensure + HP shake TID (350kcal, 20g protein) and Poli BID 2/2 PI (provides 90 kcal, 2.5 g collagen, 7 g L-Arginine, 7 g L-Glutamine/ 1 packet) to promote wound healing. Poli is intended to support tissue building and collagen formation in the dietary management of wounds, which has been clinically shown to support wound healing (including pressure injuries, diabetic foot ulcers surgical incisions, burns, and other acute/chronic wounds) by enhancing collagen formation in as little as 2 weeks. See below for other recommendations.

## 2023-09-21 NOTE — H&P ADULT - PROBLEM/PLAN-2
----- Message from Ganga Jose PA-C sent at 11/16/2022  1:05 PM EST -----  Nitrofurantoin sent to pharmacy DISPLAY PLAN FREE TEXT

## 2023-09-21 NOTE — PROGRESS NOTE ADULT - SUBJECTIVE AND OBJECTIVE BOX
Subjective:  Chief complain :  weakness, hypotension  HPI:      72 y/o/m with pmhx of  PMHx of CVA (with left-sided hemiparesis and hemiplegia), dysphagia, and facial weakness, hypotension, rhabdomyolysis, and metabolic encephalopathy [facility]/o worsening intermittent diffuse abdominal pain x 2 weeks. Pt reports [facility] and since then has mike having abdominal pain. Pt with LUE midline, on antibiotics for pseudomonas infection and bacteremia since 9/13. Reports fevers at home Tmax of 104F. Had diarrhea initially but it has since improved with meds given. Denies cough.   Patient recently has been receiving IV cefepime for pseudomonas bacterimia  Quality indicators: Skin breakdown stage 2 around the sacrum as per nursing /  (l) sided midline /  chronic Conklin ( changed in ED )     9/21 -  Patient seen and examined at bedside earlier today, + dyspnea, denies cp, cough, abdominal pain , afebrile, plan discussed    Review of system- Rest of the review of system are negative except mentioned in HPI     Vital sings reviewed for last 24 h  T(C): 36.8 (09-21-23 @ 09:45), Max: 37.1 (09-21-23 @ 03:58)  HR: 89 (09-21-23 @ 09:45) (82 - 114)  BP: 134/86 (09-21-23 @ 09:45) (99/73 - 139/48)  RR: 18 (09-21-23 @ 12:30) (16 - 18)  SpO2: 100% (09-21-23 @ 12:30) (99% - 100%)      Physical exam:   General : NAD, appear to be of stated age , well groomed   NERVOUS SYSTEM:  Alert & Oriented X3, non- focal exam, Motor Strength 5/5 B/L upper and lower extremities; DTRs 2+ intact and symmetric  HEAD:  Atraumatic, Normocephalic  EYES: EOMI, PERRLA, conjunctiva and sclera clear  HEENT: Moist mucous membranes, Supple neck , No JVD  CHEST: BS decreased at bases bilaterally; No rales, no rhonchi, no wheezing  HEART: Regular rate and rhythm; No murmurs, no rubs or gallops  ABDOMEN: Soft, Non-tender, Non-distended; Bowel sounds present, no guarding , no peritoneal irritation   GENITOURINARY- Voiding, no suprapubic tenderness  EXTREMITIES:  2+ Peripheral Pulses, No clubbing, cyanosis,   trace leg edema  MUSCULOSKELETAL:- No muscle tenderness, Muscle tone normal, No joint tenderness, no Joint swelling,  Joint ROM –normal   SKIN-no rash, no lesion    Labs radiologic and other test : all reviewed and interpreted :                         8.6    14.29 )-----------( 418      ( 21 Sep 2023 10:11 )             26.7     09-21    141  |  111<H>  |  62<H>  ----------------------------<  88  4.0   |  22  |  2.25<H>    Ca    8.1<L>      21 Sep 2023 10:11  Phos  3.7     09-21  Mg     1.9     09-21    TPro  5.8<L>  /  Alb  1.7<L>  /  TBili  0.3  /  DBili  x   /  AST  31  /  ALT  50  /  AlkPhos  114  09-21        CARDIAC MARKERS ( 21 Sep 2023 10:11 )  x     / x     / 21 U/L / x     / x        < from: CT Abdomen and Pelvis No Cont (09.20.23 @ 21:40) >  IMPRESSION:  Mild left hydronephrosis with perinephric fat stranding and urothelial   thickening. These findings are nonspecific but can be seen in the setting   of pyelonephritis or recently passed stone.    Mild circumferential mural thickening of the urinary bladder, likely   related to chronic outlet obstruction, however superimposed cystitis is   not excluded in the current context.    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    Small bilateral pleural effusions with associated atelectasis.    < end of copied text >        RECENT CULTURES:      Cardiac testing : reviewed   EKG   < from: 12 Lead ECG (09.21.23 @ 10:41) >  Sinus rhythm with occasional Premature ventricular complexes  Low voltage QRS  Cannot rule out Anterior infarct , age undetermined  Abnormal ECG  No previous ECGs available    < end of copied text >    Procedures :     Devices:     Current medications:  acetaminophen     Tablet .. 650 milliGRAM(s) Oral every 6 hours PRN  acetaminophen     Tablet .. 650 milliGRAM(s) Oral every 6 hours  aluminum hydroxide/magnesium hydroxide/simethicone Suspension 30 milliLiter(s) Oral every 4 hours PRN  aspirin enteric coated 81 milliGRAM(s) Oral daily  cefepime  Injectable. 1000 milliGRAM(s) IV Push every 12 hours  collagenase Ointment 1 Application(s) Topical three times a day  heparin   Injectable 5000 Unit(s) SubCutaneous every 8 hours  levothyroxine 50 MICROGram(s) Oral daily  magnesium oxide 400 milliGRAM(s) Oral daily  melatonin 3 milliGRAM(s) Oral at bedtime PRN  midodrine 5 milliGRAM(s) Oral every 8 hours  ondansetron Injectable 4 milliGRAM(s) IV Push every 8 hours PRN  pantoprazole    Tablet 40 milliGRAM(s) Oral before breakfast  sertraline 50 milliGRAM(s) Oral daily  sodium bicarbonate 650 milliGRAM(s) Oral two times a day

## 2023-09-21 NOTE — H&P ADULT - NSHPREVIEWOFSYSTEMS_GEN_ALL_CORE
REVIEW OF SYSTEMS:  General: NAD, hemodynamically stable, (-)  fever, (-) chills, (-) weakness  HEENT:  Eyes:  No visual loss, blurred vision, double vision or yellow sclerae. Ears, Nose, Throat:  No hearing loss, sneezing, congestion, runny nose or sore throat.  SKIN:  No rash or itching.  CARDIOVASCULAR:  No chest pain, chest pressure or chest discomfort. No palpitations or edema.  RESPIRATORY:  No shortness of breath, cough or sputum.  GASTROINTESTINAL:  No anorexia, nausea, vomiting or diarrhea. No abdominal pain or blood.  NEUROLOGICAL:  No headache, dizziness, syncope, paralysis, ataxia, numbness or tingling in the extremities. No change in bowel or bladder control.  MUSCULOSKELETAL:  No muscle, back pain, joint pain or stiffness.  HEMATOLOGIC:  No anemia, bleeding or bruising.  LYMPHATICS:  No enlarged nodes. No history of splenectomy.  ENDOCRINOLOGIC:  No reports of sweating, cold or heat intolerance. No polyuria or polydipsia.  ALLERGIES:  No history of asthma, hives, eczema or rhinitis. REVIEW OF SYSTEMS:  General: tired and weak  HEENT:  Eyes:  No visual loss, blurred vision, double vision or yellow sclerae. Ears, Nose, Throat:  No hearing loss, sneezing, congestion, runny nose or sore throat.  SKIN:  No rash or itching.  CARDIOVASCULAR:  No chest pain, chest pressure or chest discomfort. No palpitations or edema.  RESPIRATORY:  No shortness of breath, cough or sputum.  GASTROINTESTINAL:  denies currently any abdominal pain  NEUROLOGICAL:  No headache, dizziness, syncope, paralysis, ataxia, numbness or tingling in the extremities. No change in bowel or bladder control.  MUSCULOSKELETAL:  No muscle, back pain, joint pain or stiffness.  HEMATOLOGIC:  No anemia, bleeding or bruising.  LYMPHATICS:  No enlarged nodes. No history of splenectomy.  ENDOCRINOLOGIC:  No reports of sweating, cold or heat intolerance. No polyuria or polydipsia.  ALLERGIES:  No history of asthma, hives, eczema or rhinitis.

## 2023-09-21 NOTE — PATIENT PROFILE ADULT - FALL HARM RISK - HARM RISK INTERVENTIONS

## 2023-09-21 NOTE — DIETITIAN INITIAL EVALUATION ADULT - OTHER CALCULATIONS
Based on bed scale wt of 135# taken on 9/21/23 ; increased ENN to promote wt gain ; severe weight loss x 2 mo

## 2023-09-21 NOTE — H&P ADULT - PROBLEM SELECTOR PLAN 1
- this could be secondary to cystitis /  pyelo  - Started on IV antibiotics  - close follow up with Dr. Wilson - recent history of bacteriami -  being treated for pseudomonas bacterimia  (MIDLINE L arm)  - this could be secondary to cystitis /  pyelo  - Started on IV antibiotics  - close follow up with Dr. Wilson

## 2023-09-21 NOTE — DIETITIAN INITIAL EVALUATION ADULT - PERTINENT MEDS FT
MEDICATIONS  (STANDING):  acetaminophen     Tablet .. 650 milliGRAM(s) Oral every 6 hours  aspirin enteric coated 81 milliGRAM(s) Oral daily  cefepime  Injectable. 1000 milliGRAM(s) IV Push every 12 hours  collagenase Ointment 1 Application(s) Topical three times a day  heparin   Injectable 5000 Unit(s) SubCutaneous every 8 hours  levothyroxine 50 MICROGram(s) Oral daily  magnesium oxide 400 milliGRAM(s) Oral daily  midodrine 5 milliGRAM(s) Oral every 8 hours  pantoprazole    Tablet 40 milliGRAM(s) Oral before breakfast  sertraline 50 milliGRAM(s) Oral daily  sodium bicarbonate 650 milliGRAM(s) Oral two times a day    MEDICATIONS  (PRN):  acetaminophen     Tablet .. 650 milliGRAM(s) Oral every 6 hours PRN Temp greater or equal to 38C (100.4F), Mild Pain (1 - 3)  aluminum hydroxide/magnesium hydroxide/simethicone Suspension 30 milliLiter(s) Oral every 4 hours PRN Dyspepsia  melatonin 3 milliGRAM(s) Oral at bedtime PRN Insomnia  ondansetron Injectable 4 milliGRAM(s) IV Push every 8 hours PRN Nausea and/or Vomiting

## 2023-09-21 NOTE — DIETITIAN INITIAL EVALUATION ADULT - ADD RECOMMEND
1) C/w regular diet as tolerated  2) Add ensure + HP shake TID (350kcal, 20g protein) and Poli BID 2/2 stage 2 PI (provides 90 kcal, 2.5 g collagen, 7 g L-Arginine, 7 g L-Glutamine/ 1 packet) to promote wound healing. Poli is intended to support tissue building and collagen formation in the dietary management of wounds, which has been clinically shown to support wound healing (including pressure injuries, diabetic foot ulcers surgical incisions, burns, and other acute/chronic wounds) by enhancing collagen formation in as little as 2 weeks.  3) Monitor bowel movements, if no BM for >3 days, consider implementing bowel regimen.  4) Encourage protein-rich foods, maximize food preferences  5) Recommend to add MVI w/minerals, Vit C 500 mg BID, add Zinc Sulfate 220 mg x 10 days to promote wound healing.  6) Consider adding thiamine 100 mg daily 2/2 poor PO intake/ malnutrition  7) Meal encouragement; tray set-up to optimize nutrition 2/2 malnutrition/poor po intake  8) Monitor lytes/ min and replete prn.  9) Confirm goals of care regarding nutrition support  RD will continue to monitor PO intake, labs, hydration, and wt prn.

## 2023-09-21 NOTE — DIETITIAN NUTRITION RISK NOTIFICATION - BUCCAL DEPLETION IS
Attending addendum:   Agree with above  Follow up thoracic surgery  No plans for EP study at this time per EP     Harsh Sierra MD  severe

## 2023-09-21 NOTE — H&P ADULT - HISTORY OF PRESENT ILLNESS
Patient is a 74 y/o/m with pmhx of  PMHx of CVA (with left-sided hemiparesis and hemiplegia), dysphagia, and facial weakness, hypotension, rhabdomyolysis, and metabolic encephalopathy BIBEMS from [facility] to ED c/o worsening intermittent diffuse abdominal pain x 2 weeks. Pt reports he was discharged from hospital 2 weeks to [facility] rehab and since then has mike having abdominal pain. Pt with LUE midline, on antibiotics for pseudomonas infection and bacteremia since 9/13. Reports fevers at home Tmax of 104F. Had diarrhea initially but it has since improved with meds given. Denies cough. Per review of documents from [facility], pt currently receiving 2g cefepime for bacteremia. Patient is a 74 y/o/m with pmhx of  PMHx of CVA (with left-sided hemiparesis and hemiplegia), dysphagia, and facial weakness, hypotension, rhabdomyolysis, and metabolic encephalopathy BIBEMS from [facility] to ED c/o worsening intermittent diffuse abdominal pain x 2 weeks. Pt reports he was discharged from hospital 2 weeks to [facility] rehab and since then has mike having abdominal pain. Pt with TANGELA edwards, on antibiotics for pseudomonas infection and bacteremia since 9/13. Reports fevers at home Tmax of 104F. Had diarrhea initially but it has since improved with meds given. Denies cough.      Patient recently has been receiving IV cefepime for pseudomonas bacterimia    Quality indicators: Skin breakdown stage 2 around the sacrum as per nursing /  (l) sided midline /  chronic Conklin

## 2023-09-21 NOTE — DIETITIAN INITIAL EVALUATION ADULT - NSFNSGIIOFT_GEN_A_CORE
I&O's Detail    21 Sep 2023 07:01  -  21 Sep 2023 15:04  --------------------------------------------------------  IN:  Total IN: 0 mL    OUT:    Indwelling Catheter - Urethral (mL): 1400 mL  Total OUT: 1400 mL    Total NET: -1400 mL

## 2023-09-21 NOTE — DIETITIAN INITIAL EVALUATION ADULT - PERTINENT LABORATORY DATA
09-21    141  |  111<H>  |  62<H>  ----------------------------<  88  4.0   |  22  |  2.25<H>    Ca    8.1<L>      21 Sep 2023 10:11  Phos  3.7     09-21  Mg     1.9     09-21    TPro  5.8<L>  /  Alb  1.7<L>  /  TBili  0.3  /  DBili  x   /  AST  31  /  ALT  50  /  AlkPhos  114  09-21

## 2023-09-21 NOTE — PHARMACOTHERAPY INTERVENTION NOTE - COMMENTS
Medication reconciliation completed.  Reviewed Medication list and confirmed med allergies with list from Care Facility "[facility]"; confirmed with Dr. First [facility].

## 2023-09-21 NOTE — PROGRESS NOTE ADULT - ASSESSMENT
74 y/o/m with pmhx of  PMHx of CVA (with left-sided hemiparesis and hemiplegia), dysphagia, and facial weakness, hypotension, rhabdomyolysis, and metabolic encephalopathy , chronic Conklin  BIBEMS from [facility] to ED  on 9/21/23 with c/o worsening intermittent diffuse abdominal pain x 2 weeks. Pt reports [facility] and since then has mike having abdominal pain. Pt with LUE midline, on antibiotics for pseudomonas infection and bacteremia since 9/13. Reports fevers at home Tmax of 104F. Had diarrhea initially but it has since improved with meds given. Denies cough.   Patient recently has been receiving IV cefepime for pseudomonas bacterimia  Quality indicators: Skin breakdown stage 2 around the sacrum as per nursing /  (l) sided midline /  chronic Conklin ( changed in ED )       Sepsis POA due to PSAE probable left pyelonephritis   Urinary retention with left hydronephrosis   - recent history of bacteriemia -  being treated for pseudomonas bacterimia  (MIDLINE L arm)  - this could be secondary to cystitis /  pyelo  - Started on IV antibiotics cefepime 1 gm q12h   - close follow up with Dr. Wilson.      2019 novel coronavirus disease (COVID-19).   Dyspnea , elevated d-dimers   - no need for O2 support , - troponin neg   - no shortness of breath.  - check CRP, ferritin, d-dimers, vit D   - CXR - pending, doppler LE - pening   - supportive care  - pulse O2   - add heparin q12h for DVT proph   - ID follows     h/o CVA (cerebrovascular accident).   - residual deficits  - supportive care.  - c/w ASA, not on statin  - check lipid profile in am    Hypotension.   -  continue with midodrine.    Acute on chronic renal failure.   - c/w IV fluids  Creatinine Trend: 2.25<--, 2.53<--  - monitor for improvement    Normocytic anemia  - suspected anemia of chronic disease /  no signs of bleeding.    Hypothyroidism   - TSH 10, free T4 low  - start levothyroxine 50 mcg   - o/p follow up    Advance directives   - FULL code  - emergency contact sister  Marilu  [phone]  left  the message 9/21/23       74 y/o/m with pmhx of  PMHx of CVA (with left-sided hemiparesis and hemiplegia), dysphagia, and facial weakness, hypotension, rhabdomyolysis, and metabolic encephalopathy , chronic Conklin  BIBEMS from [facility] to ED  on 9/21/23 with c/o worsening intermittent diffuse abdominal pain x 2 weeks. Pt [facility] and since then has mike having abdominal pain. Pt with LUE midline, on antibiotics for pseudomonas infection and bacteremia since 9/13. Reports fevers at home Tmax of 104F. Had diarrhea initially but it has since improved with meds given. Denies cough.   Patient recently has been receiving IV cefepime for pseudomonas bacterimia  Quality indicators: Skin breakdown stage 2 around the sacrum as per nursing /  (l) sided midline /  chronic Conklin ( changed in ED )       Sepsis POA due to PSAE probable left pyelonephritis   Urinary retention with left hydronephrosis   - recent history of bacteriemia -  being treated for pseudomonas bacterimia  (MIDLINE L arm)  - this could be secondary to cystitis /  pyelo  - Started on IV antibiotics cefepime 1 gm q12h   - close follow up with Dr. Wilson.      2019 novel coronavirus disease (COVID-19).   Dyspnea , elevated d-dimers  Trace pericardial effusion , small bilateral pleural effusions, left base atelectasis vs infiltrate   - no need for O2 support , - troponin neg   - no shortness of breath.  - check CRP, ferritin, d-dimers, vit D   - 2 d echo - pending  - CXR - small b/l pleural effusion, left base infiltrate vs atelectasis   - doppler LE - pending   - supportive care  - pulse O2   - add heparin q12h for DVT proph   - ID follows     h/o CVA (cerebrovascular accident).   - residual deficits  - supportive care.  - c/w ASA, not on statin  - check lipid profile in am    Hypotension.   -  continue with midodrine.    Acute on chronic renal failure.   - c/w IV fluids  Creatinine Trend: 2.25<--, 2.53<--  - monitor for improvement    Normocytic anemia  - suspected anemia of chronic disease /  no signs of bleeding.    Hypothyroidism   - TSH 10, free T4 low  - start levothyroxine 50 mcg   - o/p follow up    Advance directives   - FULL code  - emergency contact sister  Marilu  [phone]  left  the message 9/21/23

## 2023-09-21 NOTE — CONSULT NOTE ADULT - ASSESSMENT
72 y/o/m with pmhx of  PMHx of CVA (with left-sided hemiparesis and hemiplegia), dysphagia, and facial weakness, hypotension, rhabdomyolysis, and metabolic encephalopathy BIBEMS from [facility] to ED c/o worsening intermittent diffuse abdominal pain x 2 weeks. Pt reports he was discharged from hospital 2 weeks to [facility] rehab and since then has mike having abdominal pain. Pt with LUE midline, on antibiotics for pseudomonas infection and bacteremia since 9/13. Reports fevers at home Tmax of 104F. Had diarrhea initially but it has since improved with meds given. Denies cough.  Patient recently has been receiving IV cefepime for pseudomonas bacteremia. noted with urine cx, blood cx growing PSAE 9/11 and blood cx positive again 9/14. UA positive, imaging remarkable for Mild left hydronephrosis with perinephric fat stranding and urothelial thickening. These findings are nonspecific but can be seen in the setting of pyelonephritis or recently passed stone. Mild circumferential mural thickening of the urinary bladder, likely related to chronic outlet obstruction.     1. Sepsis with PSAE.  74 y/o/m with pmhx of  PMHx of CVA (with left-sided hemiparesis and hemiplegia), dysphagia, and facial weakness, hypotension, rhabdomyolysis, and metabolic encephalopathy BIBEMS from [facility] to ED c/o worsening intermittent diffuse abdominal pain x 2 weeks. Pt [facility] and since then has mike having abdominal pain. Pt with LUE midline, on antibiotics for pseudomonas infection and bacteremia since 9/13. Reports fevers at home Tmax of 104F. Had diarrhea initially but it has since improved with meds given. Denies cough.  Patient recently has been receiving IV cefepime for pseudomonas bacteremia. noted with urine cx, blood cx growing PSAE 9/11 and blood cx positive again 9/14. UA positive, imaging remarkable for Mild left hydronephrosis with perinephric fat stranding and urothelial thickening. These findings are nonspecific but can be seen in the setting of pyelonephritis or recently passed stone. Mild circumferential mural thickening of the urinary bladder, likely related to chronic outlet obstruction.     1. Sepsis with PSAE. L hydronephrosis. L pyelonephritis. UTI. ? Passed stone. HIRA  - imaging reviewed  - blood cx 9/14 9/11 with PSAE, urine cx 9/11 PSAE sensitivities reviewed  - agree with IV cefepime [id number]  - continue with abx coverage  - f/u cultures sent here   - monitor temps  - tolerating abx well so far; no side effects noted  - reason for abx use and side effects reviewed with patient  - supportive care  - fu cbc     2. other issues - care per medicine

## 2023-09-22 LAB
24R-OH-CALCIDIOL SERPL-MCNC: 19.3 NG/ML — LOW (ref 30–80)
ALBUMIN SERPL ELPH-MCNC: 1.7 G/DL — LOW (ref 3.3–5)
ALP SERPL-CCNC: 106 U/L — SIGNIFICANT CHANGE UP (ref 40–120)
ALT FLD-CCNC: 48 U/L — SIGNIFICANT CHANGE UP (ref 12–78)
ANION GAP SERPL CALC-SCNC: 5 MMOL/L — SIGNIFICANT CHANGE UP (ref 5–17)
ANISOCYTOSIS BLD QL: SLIGHT — SIGNIFICANT CHANGE UP
APTT BLD: 139.6 SEC — CRITICAL HIGH (ref 24.5–35.6)
APTT BLD: 86.7 SEC — HIGH (ref 24.5–35.6)
APTT BLD: 87.8 SEC — HIGH (ref 24.5–35.6)
AST SERPL-CCNC: 34 U/L — SIGNIFICANT CHANGE UP (ref 15–37)
BASE EXCESS BLDV CALC-SCNC: -3.9 MMOL/L — LOW (ref -2–3)
BASOPHILS # BLD AUTO: 0 K/UL — SIGNIFICANT CHANGE UP (ref 0–0.2)
BASOPHILS NFR BLD AUTO: 0 % — SIGNIFICANT CHANGE UP (ref 0–2)
BILIRUB DIRECT SERPL-MCNC: 0.1 MG/DL — SIGNIFICANT CHANGE UP (ref 0–0.3)
BILIRUB INDIRECT FLD-MCNC: 0.2 MG/DL — SIGNIFICANT CHANGE UP (ref 0.2–1)
BILIRUB SERPL-MCNC: 0.3 MG/DL — SIGNIFICANT CHANGE UP (ref 0.2–1.2)
BUN SERPL-MCNC: 67 MG/DL — HIGH (ref 7–23)
CALCIUM SERPL-MCNC: 8 MG/DL — LOW (ref 8.5–10.1)
CHLORIDE SERPL-SCNC: 109 MMOL/L — HIGH (ref 96–108)
CHOLEST SERPL-MCNC: 149 MG/DL — SIGNIFICANT CHANGE UP
CO2 SERPL-SCNC: 22 MMOL/L — SIGNIFICANT CHANGE UP (ref 22–31)
CREAT SERPL-MCNC: 2.47 MG/DL — HIGH (ref 0.5–1.3)
CRP SERPL-MCNC: 34 MG/L — HIGH
CULTURE RESULTS: SIGNIFICANT CHANGE UP
D DIMER BLD IA.RAPID-MCNC: 838 NG/ML DDU — HIGH
DACRYOCYTES BLD QL SMEAR: SLIGHT — SIGNIFICANT CHANGE UP
EGFR: 27 ML/MIN/1.73M2 — LOW
EOSINOPHIL # BLD AUTO: 0.26 K/UL — SIGNIFICANT CHANGE UP (ref 0–0.5)
EOSINOPHIL NFR BLD AUTO: 2 % — SIGNIFICANT CHANGE UP (ref 0–6)
FERRITIN SERPL-MCNC: 2928 NG/ML — HIGH (ref 30–400)
FERRITIN SERPL-MCNC: 4259 NG/ML — HIGH (ref 30–400)
FOLATE SERPL-MCNC: 7.4 NG/ML — SIGNIFICANT CHANGE UP
GAS PNL BLDV: SIGNIFICANT CHANGE UP
GLUCOSE SERPL-MCNC: 93 MG/DL — SIGNIFICANT CHANGE UP (ref 70–99)
HCO3 BLDV-SCNC: 22 MMOL/L — SIGNIFICANT CHANGE UP (ref 22–29)
HCT VFR BLD CALC: 26.9 % — LOW (ref 39–50)
HDLC SERPL-MCNC: 39 MG/DL — LOW
HGB BLD-MCNC: 8.6 G/DL — LOW (ref 13–17)
HYPOCHROMIA BLD QL: SLIGHT — SIGNIFICANT CHANGE UP
LIPID PNL WITH DIRECT LDL SERPL: 96 MG/DL — SIGNIFICANT CHANGE UP
LYMPHOCYTES # BLD AUTO: 1.28 K/UL — SIGNIFICANT CHANGE UP (ref 1–3.3)
LYMPHOCYTES # BLD AUTO: 10 % — LOW (ref 13–44)
MACROCYTES BLD QL: SLIGHT — SIGNIFICANT CHANGE UP
MAGNESIUM SERPL-MCNC: 2.1 MG/DL — SIGNIFICANT CHANGE UP (ref 1.6–2.6)
MANUAL SMEAR VERIFICATION: SIGNIFICANT CHANGE UP
MCHC RBC-ENTMCNC: 30 PG — SIGNIFICANT CHANGE UP (ref 27–34)
MCHC RBC-ENTMCNC: 32 GM/DL — SIGNIFICANT CHANGE UP (ref 32–36)
MCV RBC AUTO: 93.7 FL — SIGNIFICANT CHANGE UP (ref 80–100)
METAMYELOCYTES # FLD: 3 % — HIGH (ref 0–0)
MONOCYTES # BLD AUTO: 0.51 K/UL — SIGNIFICANT CHANGE UP (ref 0–0.9)
MONOCYTES NFR BLD AUTO: 4 % — SIGNIFICANT CHANGE UP (ref 2–14)
MYELOCYTES NFR BLD: 3 % — HIGH (ref 0–0)
NEUTROPHILS # BLD AUTO: 9.99 K/UL — HIGH (ref 1.8–7.4)
NEUTROPHILS NFR BLD AUTO: 74 % — SIGNIFICANT CHANGE UP (ref 43–77)
NEUTS BAND # BLD: 4 % — SIGNIFICANT CHANGE UP (ref 0–8)
NON HDL CHOLESTEROL: 110 MG/DL — SIGNIFICANT CHANGE UP
NRBC # BLD: 0 /100 — SIGNIFICANT CHANGE UP (ref 0–0)
NRBC # BLD: SIGNIFICANT CHANGE UP /100 WBCS (ref 0–0)
PCO2 BLDV: 43 MMHG — SIGNIFICANT CHANGE UP (ref 42–55)
PH BLDV: 7.32 — SIGNIFICANT CHANGE UP (ref 7.32–7.43)
PHOSPHATE SERPL-MCNC: 3.1 MG/DL — SIGNIFICANT CHANGE UP (ref 2.5–4.5)
PLAT MORPH BLD: NORMAL — SIGNIFICANT CHANGE UP
PLATELET # BLD AUTO: 412 K/UL — HIGH (ref 150–400)
PLATELET COUNT - ESTIMATE: NORMAL — SIGNIFICANT CHANGE UP
PO2 BLDV: 70 MMHG — HIGH (ref 25–45)
POIKILOCYTOSIS BLD QL AUTO: SLIGHT — SIGNIFICANT CHANGE UP
POLYCHROMASIA BLD QL SMEAR: SLIGHT — SIGNIFICANT CHANGE UP
POTASSIUM SERPL-MCNC: 4.5 MMOL/L — SIGNIFICANT CHANGE UP (ref 3.5–5.3)
POTASSIUM SERPL-SCNC: 4.5 MMOL/L — SIGNIFICANT CHANGE UP (ref 3.5–5.3)
PROT SERPL-MCNC: 5.7 GM/DL — LOW (ref 6–8.3)
RBC # BLD: 2.87 M/UL — LOW (ref 4.2–5.8)
RBC # FLD: 15.8 % — HIGH (ref 10.3–14.5)
RBC BLD AUTO: ABNORMAL
SAO2 % BLDV: 94 % — HIGH (ref 67–88)
SODIUM SERPL-SCNC: 136 MMOL/L — SIGNIFICANT CHANGE UP (ref 135–145)
SPECIMEN SOURCE: SIGNIFICANT CHANGE UP
TRIGL SERPL-MCNC: 76 MG/DL — SIGNIFICANT CHANGE UP
TROPONIN I, HIGH SENSITIVITY RESULT: 14.14 NG/L — SIGNIFICANT CHANGE UP
VIT B12 SERPL-MCNC: 1170 PG/ML — SIGNIFICANT CHANGE UP (ref 232–1245)
WBC # BLD: 12.81 K/UL — HIGH (ref 3.8–10.5)
WBC # FLD AUTO: 12.81 K/UL — HIGH (ref 3.8–10.5)

## 2023-09-22 PROCEDURE — 99232 SBSQ HOSP IP/OBS MODERATE 35: CPT

## 2023-09-22 RX ORDER — POLYETHYLENE GLYCOL 3350 17 G/17G
17 POWDER, FOR SOLUTION ORAL DAILY
Refills: 0 | Status: DISCONTINUED | OUTPATIENT
Start: 2023-09-22 | End: 2023-09-28

## 2023-09-22 RX ORDER — ZINC SULFATE TAB 220 MG (50 MG ZINC EQUIVALENT) 220 (50 ZN) MG
220 TAB ORAL DAILY
Refills: 0 | Status: DISCONTINUED | OUTPATIENT
Start: 2023-09-22 | End: 2023-09-28

## 2023-09-22 RX ORDER — SODIUM CHLORIDE 9 MG/ML
1000 INJECTION INTRAMUSCULAR; INTRAVENOUS; SUBCUTANEOUS
Refills: 0 | Status: DISCONTINUED | OUTPATIENT
Start: 2023-09-22 | End: 2023-09-24

## 2023-09-22 RX ORDER — CHOLECALCIFEROL (VITAMIN D3) 125 MCG
2000 CAPSULE ORAL AT BEDTIME
Refills: 0 | Status: DISCONTINUED | OUTPATIENT
Start: 2023-09-23 | End: 2023-09-28

## 2023-09-22 RX ORDER — ERGOCALCIFEROL 1.25 MG/1
50000 CAPSULE ORAL ONCE
Refills: 0 | Status: COMPLETED | OUTPATIENT
Start: 2023-09-22 | End: 2023-09-22

## 2023-09-22 RX ORDER — THIAMINE MONONITRATE (VIT B1) 100 MG
100 TABLET ORAL AT BEDTIME
Refills: 0 | Status: DISCONTINUED | OUTPATIENT
Start: 2023-09-22 | End: 2023-09-28

## 2023-09-22 RX ADMIN — Medication 5 MILLIGRAM(S): at 17:53

## 2023-09-22 RX ADMIN — Medication 650 MILLIGRAM(S): at 13:38

## 2023-09-22 RX ADMIN — Medication 1 APPLICATION(S): at 13:41

## 2023-09-22 RX ADMIN — Medication 650 MILLIGRAM(S): at 14:38

## 2023-09-22 RX ADMIN — Medication 650 MILLIGRAM(S): at 05:37

## 2023-09-22 RX ADMIN — SODIUM CHLORIDE 100 MILLILITER(S): 9 INJECTION INTRAMUSCULAR; INTRAVENOUS; SUBCUTANEOUS at 09:00

## 2023-09-22 RX ADMIN — ERGOCALCIFEROL 50000 UNIT(S): 1.25 CAPSULE ORAL at 13:38

## 2023-09-22 RX ADMIN — Medication 650 MILLIGRAM(S): at 21:26

## 2023-09-22 RX ADMIN — Medication 650 MILLIGRAM(S): at 22:26

## 2023-09-22 RX ADMIN — Medication 81 MILLIGRAM(S): at 09:37

## 2023-09-22 RX ADMIN — MIDODRINE HYDROCHLORIDE 5 MILLIGRAM(S): 2.5 TABLET ORAL at 21:25

## 2023-09-22 RX ADMIN — Medication 650 MILLIGRAM(S): at 06:37

## 2023-09-22 RX ADMIN — HEPARIN SODIUM 0 UNIT(S)/HR: 5000 INJECTION INTRAVENOUS; SUBCUTANEOUS at 06:38

## 2023-09-22 RX ADMIN — MIDODRINE HYDROCHLORIDE 5 MILLIGRAM(S): 2.5 TABLET ORAL at 13:38

## 2023-09-22 RX ADMIN — POLYETHYLENE GLYCOL 3350 17 GRAM(S): 17 POWDER, FOR SOLUTION ORAL at 23:35

## 2023-09-22 RX ADMIN — Medication 1 APPLICATION(S): at 23:32

## 2023-09-22 RX ADMIN — SODIUM CHLORIDE 100 MILLILITER(S): 9 INJECTION INTRAMUSCULAR; INTRAVENOUS; SUBCUTANEOUS at 17:53

## 2023-09-22 RX ADMIN — CEFEPIME 1000 MILLIGRAM(S): 1 INJECTION, POWDER, FOR SOLUTION INTRAMUSCULAR; INTRAVENOUS at 21:25

## 2023-09-22 RX ADMIN — PANTOPRAZOLE SODIUM 40 MILLIGRAM(S): 20 TABLET, DELAYED RELEASE ORAL at 05:35

## 2023-09-22 RX ADMIN — SERTRALINE 50 MILLIGRAM(S): 25 TABLET, FILM COATED ORAL at 09:40

## 2023-09-22 RX ADMIN — Medication 50 MICROGRAM(S): at 05:35

## 2023-09-22 RX ADMIN — HEPARIN SODIUM 1000 UNIT(S)/HR: 5000 INJECTION INTRAVENOUS; SUBCUTANEOUS at 16:55

## 2023-09-22 RX ADMIN — Medication 650 MILLIGRAM(S): at 21:25

## 2023-09-22 RX ADMIN — HEPARIN SODIUM 0 UNIT(S)/HR: 5000 INJECTION INTRAVENOUS; SUBCUTANEOUS at 07:24

## 2023-09-22 RX ADMIN — CEFEPIME 1000 MILLIGRAM(S): 1 INJECTION, POWDER, FOR SOLUTION INTRAMUSCULAR; INTRAVENOUS at 09:37

## 2023-09-22 RX ADMIN — HEPARIN SODIUM 1000 UNIT(S)/HR: 5000 INJECTION INTRAVENOUS; SUBCUTANEOUS at 19:15

## 2023-09-22 RX ADMIN — HEPARIN SODIUM 1000 UNIT(S)/HR: 5000 INJECTION INTRAVENOUS; SUBCUTANEOUS at 23:32

## 2023-09-22 RX ADMIN — HEPARIN SODIUM 1000 UNIT(S)/HR: 5000 INJECTION INTRAVENOUS; SUBCUTANEOUS at 07:47

## 2023-09-22 RX ADMIN — Medication 650 MILLIGRAM(S): at 09:37

## 2023-09-22 RX ADMIN — Medication 1 APPLICATION(S): at 05:37

## 2023-09-22 NOTE — PROGRESS NOTE ADULT - ASSESSMENT
72 y/o/m with pmhx of  PMHx of CVA (with left-sided hemiparesis and hemiplegia), dysphagia, and facial weakness, hypotension, rhabdomyolysis, and metabolic encephalopathy , chronic Conklin  BIBEMS from [facility] to ED  on 9/21/23 with c/o worsening intermittent diffuse abdominal pain x 2 weeks. Pt reports [facility] and since then has mike having abdominal pain. Pt with LUE midline, on antibiotics for pseudomonas infection and bacteremia since 9/13. Reports fevers at home Tmax of 104F. Had diarrhea initially but it has since improved with meds given. Denies cough.   Patient recently has been receiving IV cefepime for pseudomonas bacterimia  Quality indicators: Skin breakdown stage 2 around the sacrum as per nursing /  (l) sided midline /  chronic Conklin ( changed in ED )       Sepsis POA due to PSAE probable left pyelonephritis   Urinary retention with left hydronephrosis   - recent history of bacteriemia -  being treated for pseudomonas bacterimia  (MIDLINE L arm)  - this could be secondary to cystitis /  pyelo  - Started on IV antibiotics cefepime 1 gm q12h   - close follow up with Dr. Wilson.      2019 novel coronavirus disease (COVID-19).   Dyspnea , elevated d-dimers  Trace pericardial effusion , small bilateral pleural effusions, left base atelectasis vs infiltrate   - no need for O2 support , - troponin neg   - no shortness of breath.  - check CRP, ferritin, d-dimers, vit D   - 2 d echo - pending  - CXR - small b/l pleural effusion, left base infiltrate vs atelectasis   - doppler LE - pending   - supportive care  - pulse O2   - add heparin q12h for DVT proph   - ID follows     h/o CVA (cerebrovascular accident).   - residual deficits  - supportive care.  - c/w ASA, not on statin  - check lipid profile in am    Hypotension.   -  continue with midodrine.    Acute on chronic renal failure.   - c/w IV fluids  Creatinine Trend: 2.25<--, 2.53<--  - monitor for improvement    Normocytic anemia  - suspected anemia of chronic disease /  no signs of bleeding.    Hypothyroidism   - TSH 10, free T4 low  - start levothyroxine 50 mcg   - o/p follow up    Advance directives   - FULL code  - emergency contact sister  Marliu  [phone]  left  the message 9/21/23       72 y/o/m with pmhx of  PMHx of CVA (with left-sided hemiparesis and hemiplegia), dysphagia, and facial weakness, hypotension, rhabdomyolysis, and metabolic encephalopathy , chronic Conklin  BIBEMS from [facility] to ED  on 9/21/23 with c/o worsening intermittent diffuse abdominal pain x 2 weeks. Pt [facility] and since then has mike having abdominal pain. Pt with LUE midline, on antibiotics for pseudomonas infection and bacteremia since 9/13. Reports fevers at home Tmax of 104F. Had diarrhea initially but it has since improved with meds given. Denies cough.   Patient recently has been receiving IV cefepime for pseudomonas bacterimia  Quality indicators: Skin breakdown stage 2 around the sacrum as per nursing /  (l) sided midline /  chronic Conklin ( changed in ED )       Sepsis POA due to PSAE probable left pyelonephritis   Urinary retention with left hydronephrosis   - recent history of bacteriemia -  being treated for pseudomonas bacterimia  (MIDLINE L arm)  - this could be secondary to cystitis /  pyelo  - blood cx x2 neg, urine cx - neg ( pt was on IV abx)   - Started on IV antibiotics cefepime 1 gm q12h   - close follow up with Dr. Wilson.    Dyspnea , 2019 novel coronavirus disease (COVID-19).   elevated d-dimers due to Bilateral DVT   Trace pericardial effusion , small bilateral pleural effusions, left base atelectasis vs infiltrate  Moderate to severe MR    - no need for O2 support , - troponin neg   - no shortness of breath.  - CRP 40--> 34,  ferritin 2900--> 4200, d-dimers 620-- 830  - 2 d echo - EF wnl, mod-severe MR  - CXR - small b/l pleural effusion, left base infiltrate vs atelectasis   - doppler LE - + b/l DVT  - supportive care,  pulse O2   - continue heparin drip until renal function improves   - ID follows     h/o CVA (cerebrovascular accident).   - residual deficits  - supportive care.  - c/w ASA, not on statin  - check lipid profile in am - LDL 96     Hypotension.   -  continue with midodrine.    Acute on chronic renal failure.   - c/w IV fluids  - Creatinine Trend: 2.47<--, 2.25<--, 2.53<--  - monitor for improvement  - nephrology consult    Normocytic anemia  - suspected anemia of chronic disease /  no signs of bleeding.    Hypothyroidism   - TSH 10, free T4 low  - start levothyroxine 50 mcg   - o/p follow up    Vitamin D deficiency - replace    Constipation - miralax, dulcolax , monitor bm    Severe protein-calorie malnutrition.   - nutritional education provided   - supplements ordered   - thiamine 100 qd, Zinc, vit D ordered      Weakness - PT evaluation    Advance directives   - FULL code  - emergency contact sister  Marilu  [phone]  left  the message 9/21/23, 9/22    Dispo - IV heparin, IV fluids, IV abx, inpatient monitoring , PT evaluation

## 2023-09-22 NOTE — PROGRESS NOTE ADULT - SUBJECTIVE AND OBJECTIVE BOX
Date of service: 09-22-23 @ 13:24    pt seen and examined  feels better  sitting up in bed  no fevers  freitas in place    ROS: no fever or chills; denies dizziness, no HA, no SOB or cough, no abdominal pain, no diarrhea or constipation; no legs pain, no rashes    MEDICATIONS  (STANDING):  acetaminophen     Tablet .. 650 milliGRAM(s) Oral every 8 hours  aspirin enteric coated 81 milliGRAM(s) Oral daily  cefepime  Injectable. 1000 milliGRAM(s) IV Push every 12 hours  collagenase Ointment 1 Application(s) Topical three times a day  heparin  Infusion.  Unit(s)/Hr (12 mL/Hr) IV Continuous <Continuous>  levothyroxine 50 MICROGram(s) Oral daily  midodrine 5 milliGRAM(s) Oral every 8 hours  pantoprazole    Tablet 40 milliGRAM(s) Oral before breakfast  sertraline 50 milliGRAM(s) Oral daily  sodium bicarbonate 650 milliGRAM(s) Oral two times a day  sodium chloride 0.9%. 1000 milliLiter(s) (100 mL/Hr) IV Continuous <Continuous>    Vital Signs Last 24 Hrs  T(C): 37 (22 Sep 2023 08:50), Max: 37 (21 Sep 2023 21:03)  T(F): 98.6 (22 Sep 2023 08:50), Max: 98.6 (21 Sep 2023 21:03)  HR: 92 (22 Sep 2023 13:30) (81 - 100)  BP: 120/75 (22 Sep 2023 13:30) (113/69 - 128/94)  BP(mean): --  RR: 18 (22 Sep 2023 13:30) (16 - 18)  SpO2: 100% (22 Sep 2023 13:30) (100% - 100%)    Parameters below as of 22 Sep 2023 13:30  Patient On (Oxygen Delivery Method): room air      PE:  Constitutional: NAD   HEENT: NC/AT, EOMI, PERRLA, conjunctivae clear; ears and nose atraumatic; pharynx benign  Neck: supple; thyroid not palpable  Back: no tenderness  Respiratory: respiratory effort normal; clear to auscultation  Cardiovascular: S1S2 regular, no murmurs  Abdomen: soft, not tender, not distended, positive BS; liver and spleen WNL  Genitourinary: no suprapubic tenderness  Lymphatic: no LN palpable  Musculoskeletal: no muscle tenderness, no joint swelling or tenderness  Extremities: no pedal edema  Neurological/ Psychiatric: AxOx3, Judgement and insight normal;  moving all extremities  Skin: no rashes; no palpable lesions    Labs: all available labs reviewed                                   8.6    12.81 )-----------( 412      ( 22 Sep 2023 05:51 )             26.9     09-22    136  |  109<H>  |  67<H>  ----------------------------<  93  4.5   |  22  |  2.47<H>    Ca    8.0<L>      22 Sep 2023 05:51  Phos  3.1     09-22  Mg     2.1     09-22    TPro  5.7<L>  /  Alb  1.7<L>  /  TBili  0.3  /  DBili  0.1  /  AST  34  /  ALT  48  /  AlkPhos  106  09-22          LIVER FUNCTIONS - ( 21 Sep 2023 10:11 )  Alb: 1.7 g/dL / Pro: 5.8 gm/dL / ALK PHOS: 114 U/L / ALT: 50 U/L / AST: 31 U/L / GGT: x           Urinalysis Basic - ( 21 Sep 2023 10:11 )    Color: x / Appearance: x / SG: x / pH: x  Gluc: 88 mg/dL / Ketone: x  / Bili: x / Urobili: x   Blood: x / Protein: x / Nitrite: x   Leuk Esterase: x / RBC: x / WBC x   Sq Epi: x / Non Sq Epi: x / Bacteria: x    9/11, 9/14 blood cx growing PSAE; urine cx PSAE       Radiology: all available radiological tests reviewed  < from: CT Abdomen and Pelvis No Cont (09.20.23 @ 21:40) >    ACC: [id number] EXAM:  CT ABDOMEN AND PELVIS   ORDERED BY: TOBI LAWS     PROCEDURE DATE:  09/20/2023          INTERPRETATION:  CLINICAL INFORMATION: Abdominal pain    COMPARISON: None.    CONTRAST/COMPLICATIONS:  IV Contrast: NONE  Oral Contrast: NONE  Complications: None reported at time of study completion    PROCEDURE:  CT of the Abdomen and Pelvis was performed.  Sagittal and coronal reformats were performed.    FINDINGS:  LOWER CHEST: Small bilateral pleural effusions with associated   atelectasis. Small pericardial effusion. Coronary artery calcifications.   Moderate hiatal hernia.    LIVER: Within normal limits.  BILE DUCTS: Normal caliber.  GALLBLADDER: Mural calcification of the gallbladder. Cholelithiasis   without evidence of acute cholecystitis..  SPLEEN: Within normal limits.  PANCREAS: Within normal limits.  ADRENALS: Within normal limits.  KIDNEYS/URETERS: Mild left hydronephrosis with perinephric fat stranding   and urothelial thickening    BLADDER: Mild circumferentialmural thickening of the urinary bladder.   Catheter in place.  REPRODUCTIVE ORGANS: Prostate is enlarged.    BOWEL: No bowel obstruction. Appendix is not visualized. No evidence of   inflammation in the pericecal region. Moderate colonic stool burden.  PERITONEUM: No ascites.  VESSELS: Atherosclerotic changes.  RETROPERITONEUM/LYMPH NODES: No lymphadenopathy.  ABDOMINAL WALL: Diffuse body wall edema.  BONES: No acute osseous abnormality. Degenerative changes in the spine.    IMPRESSION:  Mild left hydronephrosis with perinephric fat stranding and urothelial   thickening. These findings are nonspecific but can be seen in the setting   of pyelonephritis or recently passed stone.    Mild circumferential mural thickening of the urinary bladder, likely   related to chronic outlet obstruction, however superimposed cystitis is   not excluded in the current context.    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    Small bilateral pleural effusions with associated atelectasis.    < end of copied text >    Advanced directives addressed: full resuscitation

## 2023-09-22 NOTE — CONSULT NOTE ADULT - SUBJECTIVE AND OBJECTIVE BOX
Patient is a 73y Male whom presented to the hospital with PMHx of recent CVA (with left-sided hemiparesis and hemiplegia), dysphagia, and facial weakness, hypotension, rhabdomyolysis, and metabolic encephalopathy with history of HIRA recently (nearly requiring HD) with renal follow up of HIRA.   BIBEMS from [facility] to ED c/o worsening intermittent diffuse abdominal pain x 2 weeks. Pt with LUE midline, on antibiotics for pseudomonas infection and bacteremia since 9/13.   PAST MEDICAL & SURGICAL HISTORY:  CVA (cerebrovascular accident)      Hypotension      Metabolic encephalopathy      Rhabdomyolysis      GERD (gastroesophageal reflux disease)          MEDICATIONS  (STANDING):  acetaminophen     Tablet .. 650 milliGRAM(s) Oral every 8 hours  aspirin enteric coated 81 milliGRAM(s) Oral daily  cefepime  Injectable. 1000 milliGRAM(s) IV Push every 12 hours  collagenase Ointment 1 Application(s) Topical three times a day  heparin  Infusion.  Unit(s)/Hr (12 mL/Hr) IV Continuous <Continuous>  levothyroxine 50 MICROGram(s) Oral daily  midodrine 5 milliGRAM(s) Oral every 8 hours  pantoprazole    Tablet 40 milliGRAM(s) Oral before breakfast  sertraline 50 milliGRAM(s) Oral daily  sodium bicarbonate 650 milliGRAM(s) Oral two times a day  sodium chloride 0.9%. 1000 milliLiter(s) (100 mL/Hr) IV Continuous <Continuous>    MEDICATIONS  (PRN):  acetaminophen     Tablet .. 650 milliGRAM(s) Oral every 6 hours PRN Temp greater or equal to 38C (100.4F), Mild Pain (1 - 3)  aluminum hydroxide/magnesium hydroxide/simethicone Suspension 30 milliLiter(s) Oral every 4 hours PRN Dyspepsia  bisacodyl 5 milliGRAM(s) Oral every 12 hours PRN Constipation  heparin   Injectable 5500 Unit(s) IV Push every 6 hours PRN For aPTT less than 40  heparin   Injectable 2500 Unit(s) IV Push every 6 hours PRN For aPTT between 40 - 57  melatonin 3 milliGRAM(s) Oral at bedtime PRN Insomnia  ondansetron Injectable 4 milliGRAM(s) IV Push every 8 hours PRN Nausea and/or Vomiting      Allergies    No Known Allergies    Intolerances        SOCIAL HISTORY:    FAMILY HISTORY:      REVIEW OF SYSTEMS:    CONSTITUTIONAL: stable weakness, fevers or chills  EYES/ENT: No visual changes;  No vertigo or throat pain   NECK: No pain or stiffness  RESPIRATORY: No cough, wheezing, hemoptysis; No shortness of breath  CARDIOVASCULAR: No chest pain or palpitations  GASTROINTESTINAL: No abdominal or epigastric pain. No nausea, vomiting, or hematemesis; No diarrhea or constipation. No melena or hematochezia.  GENITOURINARY: No dysuria, frequency or hematuria  NEUROLOGICAL: No numbness or weakness  SKIN: No itching, burning, rashes, or lesions   All other review of systems is negative unless indicated above.      T(C): , Max: 37 (09-21-23 @ 21:03)  T(F): , Max: 98.6 (09-21-23 @ 21:03)  HR: 92 (09-22-23 @ 13:30)  BP: 120/75 (09-22-23 @ 13:30)  BP(mean): --  RR: 18 (09-22-23 @ 13:30)  SpO2: 100% (09-22-23 @ 13:30)  Wt(kg): --    09-21 @ 07:01  -  09-22 @ 07:00  --------------------------------------------------------  IN: 0 mL / OUT: 2700 mL / NET: -2700 mL          PHYSICAL EXAM:    Constitutional: NAD, frail  HEENT: MM  dist  Cardiovascular: S1 and S2   Extremities: No peripheral edema  Neurological: A/O x 3           LABS:                        8.6    12.81 )-----------( 412      ( 22 Sep 2023 05:51 )             26.9     22 Sep 2023 05:51    136    |  109    |  67     ----------------------------<  93     4.5     |  22     |  2.47   21 Sep 2023 10:11    141    |  111    |  62     ----------------------------<  88     4.0     |  22     |  2.25   20 Sep 2023 18:40    136    |  108    |  75     ----------------------------<  160    4.5     |  22     |  2.53     Ca    8.0        22 Sep 2023 05:51  Ca    8.1        21 Sep 2023 10:11  Ca    7.9        20 Sep 2023 18:40  Phos  3.1       22 Sep 2023 05:51  Phos  3.7       21 Sep 2023 10:11  Mg     2.1       22 Sep 2023 05:51  Mg     1.9       21 Sep 2023 10:11    TPro  5.7    /  Alb  1.7    /  TBili  0.3    /  DBili  0.1    /  AST  34     /  ALT  48     /  AlkPhos  106    22 Sep 2023 05:51  TPro  5.8    /  Alb  1.7    /  TBili  0.3    /  DBili  x      /  AST  31     /  ALT  50     /  AlkPhos  114    21 Sep 2023 10:11  TPro  5.8    /  Alb  1.7    /  TBili  0.3    /  DBili  x      /  AST  36     /  ALT  57     /  AlkPhos  129    20 Sep 2023 18:40      Cholesterol: 149 mg/dL (09-22 @ 05:51)      Urine Studies:  Urinalysis Basic - ( 22 Sep 2023 05:51 )    Color: x / Appearance: x / SG: x / pH: x  Gluc: 93 mg/dL / Ketone: x  / Bili: x / Urobili: x   Blood: x / Protein: x / Nitrite: x   Leuk Esterase: x / RBC: x / WBC x   Sq Epi: x / Non Sq Epi: x / Bacteria: x            RADIOLOGY & ADDITIONAL STUDIES:

## 2023-09-22 NOTE — CONSULT NOTE ADULT - ASSESSMENT
73 with poor medical compliance (never saw a Dr until recent CVA) CVA (with left-sided hemiparesis and hemiplegia), dysphagia, and facial weakness, hypotension, rhabdomyolysis, and metabolic encephalopathy with history of HIRA recently (nearly requiring HD) with renal follow up of HIRA.     HIRA  -Renal function appears to be stabilizing from last admit at [facility]  -Trend renal panel  -Renally dose meds  -NO nsaids/contrast  -OPtimize intake  -IVF if po not at goal    D/c with RN staff, family at bedside

## 2023-09-22 NOTE — PROGRESS NOTE ADULT - ASSESSMENT
72 y/o/m with pmhx of  PMHx of CVA (with left-sided hemiparesis and hemiplegia), dysphagia, and facial weakness, hypotension, rhabdomyolysis, and metabolic encephalopathy BIBEMS from [facility] to ED c/o worsening intermittent diffuse abdominal pain x 2 weeks. Pt [facility] and since then has mike having abdominal pain. Pt with LUE midline, on antibiotics for pseudomonas infection and bacteremia since 9/13. Reports fevers at home Tmax of 104F. Had diarrhea initially but it has since improved with meds given. Denies cough.  Patient recently has been receiving IV cefepime for pseudomonas bacteremia. noted with urine cx, blood cx growing PSAE 9/11 and blood cx positive again 9/14. UA positive, imaging remarkable for Mild left hydronephrosis with perinephric fat stranding and urothelial thickening. These findings are nonspecific but can be seen in the setting of pyelonephritis or recently passed stone. Mild circumferential mural thickening of the urinary bladder, likely related to chronic outlet obstruction.     1. Sepsis with PSAE. L hydronephrosis. L pyelonephritis. UTI. ? Passed stone. HIRA  - imaging reviewed  - blood cx 9/14 9/11 with PSAE, urine cx 9/11 PSAE sensitivities reviewed  - on IV cefepime [id number] #2 s/p merrem #1   - continue with abx coverage  - blood cx no growth 9/20  - monitor temps  - tolerating abx well so far; no side effects noted  - reason for abx use and side effects reviewed with patient  - on dc midline - iv cefepime [id number] 14 days until --10/3  - supportive care  - fu cbc     2. other issues - care per medicine

## 2023-09-22 NOTE — PROGRESS NOTE ADULT - SUBJECTIVE AND OBJECTIVE BOX
Subjective:  Chief complain :  weakness, hypotension  HPI:      74 y/o/m with pmhx of  PMHx of CVA (with left-sided hemiparesis and hemiplegia), dysphagia, and facial weakness, hypotension, rhabdomyolysis, and metabolic encephalopathy BIBEMS from [facility] to ED c/o worsening intermittent diffuse abdominal pain x 2 weeks. Pt reports he was [facility] and since then has mike having abdominal pain. Pt with LUE midline, on antibiotics for pseudomonas infection and bacteremia since 9/13. Reports fevers at home Tmax of 104F. Had diarrhea initially but it has since improved with meds given. Denies cough.   Patient recently has been receiving IV cefepime for pseudomonas bacterimia  Quality indicators: Skin breakdown stage 2 around the sacrum as per nursing /  (l) sided midline /  chronic Conklin ( changed in ED )     9/21 -  Patient seen and examined at bedside earlier today, + dyspnea, denies cp, cough, abdominal pain , afebrile, plan discussed    Review of system- Rest of the review of system are negative except mentioned in HPI     Vital sings reviewed for last 24 h  T(C): 36.8 (09-21-23 @ 09:45), Max: 37.1 (09-21-23 @ 03:58)  HR: 89 (09-21-23 @ 09:45) (82 - 114)  BP: 134/86 (09-21-23 @ 09:45) (99/73 - 139/48)  RR: 18 (09-21-23 @ 12:30) (16 - 18)  SpO2: 100% (09-21-23 @ 12:30) (99% - 100%)      Physical exam:   General : NAD, appear to be of stated age , well groomed   NERVOUS SYSTEM:  Alert & Oriented X3, non- focal exam, Motor Strength 5/5 B/L upper and lower extremities; DTRs 2+ intact and symmetric  HEAD:  Atraumatic, Normocephalic  EYES: EOMI, PERRLA, conjunctiva and sclera clear  HEENT: Moist mucous membranes, Supple neck , No JVD  CHEST: BS decreased at bases bilaterally; No rales, no rhonchi, no wheezing  HEART: Regular rate and rhythm; No murmurs, no rubs or gallops  ABDOMEN: Soft, Non-tender, Non-distended; Bowel sounds present, no guarding , no peritoneal irritation   GENITOURINARY- Voiding, no suprapubic tenderness  EXTREMITIES:  2+ Peripheral Pulses, No clubbing, cyanosis,   trace leg edema  MUSCULOSKELETAL:- No muscle tenderness, Muscle tone normal, No joint tenderness, no Joint swelling,  Joint ROM –normal   SKIN-no rash, no lesion    Labs radiologic and other test : all reviewed and interpreted :                         8.6    14.29 )-----------( 418      ( 21 Sep 2023 10:11 )             26.7     09-21    141  |  111<H>  |  62<H>  ----------------------------<  88  4.0   |  22  |  2.25<H>    Ca    8.1<L>      21 Sep 2023 10:11  Phos  3.7     09-21  Mg     1.9     09-21    TPro  5.8<L>  /  Alb  1.7<L>  /  TBili  0.3  /  DBili  x   /  AST  31  /  ALT  50  /  AlkPhos  114  09-21        CARDIAC MARKERS ( 21 Sep 2023 10:11 )  x     / x     / 21 U/L / x     / x        < from: CT Abdomen and Pelvis No Cont (09.20.23 @ 21:40) >  IMPRESSION:  Mild left hydronephrosis with perinephric fat stranding and urothelial   thickening. These findings are nonspecific but can be seen in the setting   of pyelonephritis or recently passed stone.    Mild circumferential mural thickening of the urinary bladder, likely   related to chronic outlet obstruction, however superimposed cystitis is   not excluded in the current context.    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    Small bilateral pleural effusions with associated atelectasis.    < end of copied text >        RECENT CULTURES:      Cardiac testing : reviewed   EKG   < from: 12 Lead ECG (09.21.23 @ 10:41) >  Sinus rhythm with occasional Premature ventricular complexes  Low voltage QRS  Cannot rule out Anterior infarct , age undetermined  Abnormal ECG  No previous ECGs available    < end of copied text >    Procedures :     Devices:     Current medications:  acetaminophen     Tablet .. 650 milliGRAM(s) Oral every 6 hours PRN  acetaminophen     Tablet .. 650 milliGRAM(s) Oral every 6 hours  aluminum hydroxide/magnesium hydroxide/simethicone Suspension 30 milliLiter(s) Oral every 4 hours PRN  aspirin enteric coated 81 milliGRAM(s) Oral daily  cefepime  Injectable. 1000 milliGRAM(s) IV Push every 12 hours  collagenase Ointment 1 Application(s) Topical three times a day  heparin   Injectable 5000 Unit(s) SubCutaneous every 8 hours  levothyroxine 50 MICROGram(s) Oral daily  magnesium oxide 400 milliGRAM(s) Oral daily  melatonin 3 milliGRAM(s) Oral at bedtime PRN  midodrine 5 milliGRAM(s) Oral every 8 hours  ondansetron Injectable 4 milliGRAM(s) IV Push every 8 hours PRN  pantoprazole    Tablet 40 milliGRAM(s) Oral before breakfast  sertraline 50 milliGRAM(s) Oral daily  sodium bicarbonate 650 milliGRAM(s) Oral two times a day             Subjective:  Chief complain :  weakness, hypotension  HPI:      72 y/o/m with pmhx of  PMHx of CVA (with left-sided hemiparesis and hemiplegia), dysphagia, and facial weakness, hypotension, rhabdomyolysis, and metabolic encephalopathy B[facility]EMS from [facility] to ED c/o worsening intermittent diffuse abdominal pain x 2 weeks. Pt reports [facility] and since then has mike having abdominal pain. Pt with LUE midline, on antibiotics for pseudomonas infection and bacteremia since . Reports fevers at home Tmax of 104F. Had diarrhea initially but it has since improved with meds given. Denies cough.   Patient recently has been receiving IV cefepime for pseudomonas bacterimia  Quality indicators: Skin breakdown stage 2 around the sacrum as per nursing /  (l) sided midline /  chronic Conklin ( changed in ED )      -  Patient seen and examined at bedside earlier today, + dyspnea, denies cp, cough, abdominal pain , afebrile, plan discussed   - events noted , + DVT started on heparin drip, + constipation, + weakness, denies cp, + some cough, some dyspnea, on RA, afebrile    Review of system- Rest of the review of system are negative except mentioned in HPI    Vital sings reviewed for last 24 h  T(C): 36.6 (23 @ 21:15), Max: 37 (23 @ 08:50)  T(F): 97.9 (23 @ 21:15), Max: 98.6 (23 @ 08:50)  HR: 119 (23 @ 21:15) (81 - 119)  BP: 100/60 (23 @ 21:28) (100/60 - 128/94)  RR: 16 (23 @ 21:28) (16 - 18)  SpO2: 100% (23 @ 21:28) (100% - 100%)  Wt(kg): --  Daily     Daily Weight in k.7 (22 Sep 2023 06:15)  CAPILLARY BLOOD GLUCOSE      Physical exam:   General : NAD, appear to be of stated age , well groomed   NERVOUS SYSTEM:  Alert & Oriented X3, non- focal exam, Motor Strength 5/5 B/L upper and lower extremities; DTRs 2+ intact and symmetric  HEAD:  Atraumatic, Normocephalic  EYES: EOMI, PERRLA, conjunctiva and sclera clear  HEENT: Moist mucous membranes, Supple neck , No JVD  CHEST: BS decreased at bases bilaterally; No rales, no rhonchi, no wheezing  HEART: Regular rate and rhythm; No murmurs, no rubs or gallops  ABDOMEN: Soft, Non-tender, Non-distended; Bowel sounds present, no guarding , no peritoneal irritation , + Conklin with clear urine  GENITOURINARY- Voiding, no suprapubic tenderness  EXTREMITIES:  2+ Peripheral Pulses, No clubbing, cyanosis,   trace leg edema  MUSCULOSKELETAL:- No muscle tenderness, Muscle tone normal, No joint tenderness, no Joint swelling,  Joint ROM –normal   SKIN-no rash, no lesion    Labs radiologic and other test : all reviewed and interpreted :                         8.6    12.81 )-----------( 412      ( 22 Sep 2023 05:51 )             26.9         136  |  109<H>  |  67<H>  ----------------------------<  93  4.5   |  22  |  2.47<H>    Ca    8.0<L>      22 Sep 2023 05:51  Phos  3.1       Mg     2.1         TPro  5.7<L>  /  Alb  1.7<L>  /  TBili  0.3  /  DBili  0.1  /  AST  34  /  ALT  48  /  AlkPhos  106      CARDIAC MARKERS ( 21 Sep 2023 10:11 )  x     / x     / 21 U/L / x     / x          LIVER FUNCTIONS - ( 22 Sep 2023 05:51 )  Alb: 1.7 g/dL / Pro: 5.7 gm/dL / ALK PHOS: 106 U/L / ALT: 48 U/L / AST: 34 U/L / GGT: x            US Duplex Venous Lower Ext Complete, Bilateral (23 @ 19:23) >    IMPRESSION:  Bilateral below the knee DVT. Results were discussed with physician   assistant Almas at 8:20 PM on 2023.     Xray Chest 1 View-PORTABLE IMMEDIATE (Xray Chest 1 View-PORTABLE IMMEDIATE .) (23 @ 16:06) >  Impression:    There are small bilateral pleural effusions.  There is also atelectasis/infiltrate left base.    CT Abdomen and Pelvis No Cont (23 @ 21:40) >  IMPRESSION:  Mild left hydronephrosis with perinephric fat stranding and urothelial   thickening. These findings are nonspecific but can be seen in the setting   of pyelonephritis or recently passed stone.    Mild circumferential mural thickening of the urinary bladder, likely   related to chronic outlet obstruction, however superimposed cystitis is   not excluded in the current context.    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    Small bilateral pleural effusions with associated atelectasis.    TTE Echo Complete w/o Contrast w/ Doppler (23 @ 14:29) >   Summary     The left ventricle is normal in size, wall motion and contractility as   seen in limited views.   Estimated left ventricular ejection fraction is 55-60 %.   Mild concentric left ventricular hypertrophy is present.   Normal appearing right ventricle structure and function.   Moderate to severe eccentric anteriorly directed mitral regurgitation.      RECENT CULTURES:      Cardiac testing : reviewed   EKG   12 Lead ECG (23 @ 10:41) >  Sinus rhythm with occasional Premature ventricular complexes  Low voltage QRS  Cannot rule out Anterior infarct , age undetermined  Abnormal ECG  No previous ECGs available    Troponin I, High Sensitivity Result: 14.14:    Pro-Brain Natriuretic Peptide: 1653 pg/mL (23 @ 10:11)    Creatine Kinase, Serum: 21 U/L (23 @ 10:11)        Procedures :     Devices:   MEDICATIONS  (STANDING):  acetaminophen     Tablet .. 650 milliGRAM(s) Oral every 8 hours  aspirin enteric coated 81 milliGRAM(s) Oral daily  cefepime  Injectable. 1000 milliGRAM(s) IV Push every 12 hours  collagenase Ointment 1 Application(s) Topical three times a day  heparin  Infusion.  Unit(s)/Hr (12 mL/Hr) IV Continuous <Continuous>  levothyroxine 50 MICROGram(s) Oral daily  midodrine 5 milliGRAM(s) Oral every 8 hours  pantoprazole    Tablet 40 milliGRAM(s) Oral before breakfast  sertraline 50 milliGRAM(s) Oral daily  sodium bicarbonate 650 milliGRAM(s) Oral two times a day  sodium chloride 0.9%. 1000 milliLiter(s) (100 mL/Hr) IV Continuous <Continuous>    MEDICATIONS  (PRN):  acetaminophen     Tablet .. 650 milliGRAM(s) Oral every 6 hours PRN Temp greater or equal to 38C (100.4F), Mild Pain (1 - 3)  aluminum hydroxide/magnesium hydroxide/simethicone Suspension 30 milliLiter(s) Oral every 4 hours PRN Dyspepsia  bisacodyl 5 milliGRAM(s) Oral every 12 hours PRN Constipation  heparin   Injectable 5500 Unit(s) IV Push every 6 hours PRN For aPTT less than 40  heparin   Injectable 2500 Unit(s) IV Push every 6 hours PRN For aPTT between 40 - 57  melatonin 3 milliGRAM(s) Oral at bedtime PRN Insomnia  ondansetron Injectable 4 milliGRAM(s) IV Push every 8 hours PRN Nausea and/or Vomiting

## 2023-09-23 LAB
ALBUMIN SERPL ELPH-MCNC: 1.6 G/DL — LOW (ref 3.3–5)
ALP SERPL-CCNC: 99 U/L — SIGNIFICANT CHANGE UP (ref 40–120)
ALT FLD-CCNC: 44 U/L — SIGNIFICANT CHANGE UP (ref 12–78)
ANION GAP SERPL CALC-SCNC: 3 MMOL/L — LOW (ref 5–17)
APTT BLD: 63.6 SEC — HIGH (ref 24.5–35.6)
AST SERPL-CCNC: 30 U/L — SIGNIFICANT CHANGE UP (ref 15–37)
BILIRUB SERPL-MCNC: 0.3 MG/DL — SIGNIFICANT CHANGE UP (ref 0.2–1.2)
BUN SERPL-MCNC: 58 MG/DL — HIGH (ref 7–23)
CALCIUM SERPL-MCNC: 7.7 MG/DL — LOW (ref 8.5–10.1)
CHLORIDE SERPL-SCNC: 112 MMOL/L — HIGH (ref 96–108)
CO2 SERPL-SCNC: 25 MMOL/L — SIGNIFICANT CHANGE UP (ref 22–31)
CREAT SERPL-MCNC: 2.04 MG/DL — HIGH (ref 0.5–1.3)
CRP SERPL-MCNC: 25 MG/L — HIGH
D DIMER BLD IA.RAPID-MCNC: 644 NG/ML DDU — HIGH
EGFR: 34 ML/MIN/1.73M2 — LOW
FERRITIN SERPL-MCNC: 4424 NG/ML — HIGH (ref 30–400)
GLUCOSE SERPL-MCNC: 90 MG/DL — SIGNIFICANT CHANGE UP (ref 70–99)
HCT VFR BLD CALC: 27.4 % — LOW (ref 39–50)
HGB BLD-MCNC: 8.9 G/DL — LOW (ref 13–17)
MAGNESIUM SERPL-MCNC: 1.8 MG/DL — SIGNIFICANT CHANGE UP (ref 1.6–2.6)
MCHC RBC-ENTMCNC: 30.3 PG — SIGNIFICANT CHANGE UP (ref 27–34)
MCHC RBC-ENTMCNC: 32.5 GM/DL — SIGNIFICANT CHANGE UP (ref 32–36)
MCV RBC AUTO: 93.2 FL — SIGNIFICANT CHANGE UP (ref 80–100)
NT-PROBNP SERPL-SCNC: 1277 PG/ML — HIGH (ref 0–125)
PHOSPHATE SERPL-MCNC: 2.8 MG/DL — SIGNIFICANT CHANGE UP (ref 2.5–4.5)
PLATELET # BLD AUTO: 409 K/UL — HIGH (ref 150–400)
POTASSIUM SERPL-MCNC: 5 MMOL/L — SIGNIFICANT CHANGE UP (ref 3.5–5.3)
POTASSIUM SERPL-SCNC: 5 MMOL/L — SIGNIFICANT CHANGE UP (ref 3.5–5.3)
PROCALCITONIN SERPL-MCNC: 0.62 NG/ML — HIGH (ref 0.02–0.1)
PROT SERPL-MCNC: 5.7 GM/DL — LOW (ref 6–8.3)
RBC # BLD: 2.94 M/UL — LOW (ref 4.2–5.8)
RBC # FLD: 15.6 % — HIGH (ref 10.3–14.5)
SODIUM SERPL-SCNC: 140 MMOL/L — SIGNIFICANT CHANGE UP (ref 135–145)
WBC # BLD: 13.11 K/UL — HIGH (ref 3.8–10.5)
WBC # FLD AUTO: 13.11 K/UL — HIGH (ref 3.8–10.5)

## 2023-09-23 PROCEDURE — 99233 SBSQ HOSP IP/OBS HIGH 50: CPT

## 2023-09-23 RX ADMIN — HEPARIN SODIUM 1000 UNIT(S)/HR: 5000 INJECTION INTRAVENOUS; SUBCUTANEOUS at 19:32

## 2023-09-23 RX ADMIN — Medication 650 MILLIGRAM(S): at 10:40

## 2023-09-23 RX ADMIN — Medication 1 APPLICATION(S): at 17:12

## 2023-09-23 RX ADMIN — Medication 100 MILLIGRAM(S): at 23:00

## 2023-09-23 RX ADMIN — Medication 650 MILLIGRAM(S): at 06:11

## 2023-09-23 RX ADMIN — MIDODRINE HYDROCHLORIDE 5 MILLIGRAM(S): 2.5 TABLET ORAL at 17:11

## 2023-09-23 RX ADMIN — MIDODRINE HYDROCHLORIDE 5 MILLIGRAM(S): 2.5 TABLET ORAL at 23:00

## 2023-09-23 RX ADMIN — HEPARIN SODIUM 1000 UNIT(S)/HR: 5000 INJECTION INTRAVENOUS; SUBCUTANEOUS at 00:55

## 2023-09-23 RX ADMIN — Medication 2000 UNIT(S): at 23:00

## 2023-09-23 RX ADMIN — CEFEPIME 1000 MILLIGRAM(S): 1 INJECTION, POWDER, FOR SOLUTION INTRAMUSCULAR; INTRAVENOUS at 10:39

## 2023-09-23 RX ADMIN — HEPARIN SODIUM 1000 UNIT(S)/HR: 5000 INJECTION INTRAVENOUS; SUBCUTANEOUS at 07:25

## 2023-09-23 RX ADMIN — Medication 650 MILLIGRAM(S): at 17:11

## 2023-09-23 RX ADMIN — PANTOPRAZOLE SODIUM 40 MILLIGRAM(S): 20 TABLET, DELAYED RELEASE ORAL at 06:10

## 2023-09-23 RX ADMIN — MIDODRINE HYDROCHLORIDE 5 MILLIGRAM(S): 2.5 TABLET ORAL at 06:11

## 2023-09-23 RX ADMIN — Medication 1 APPLICATION(S): at 06:31

## 2023-09-23 RX ADMIN — SODIUM CHLORIDE 100 MILLILITER(S): 9 INJECTION INTRAMUSCULAR; INTRAVENOUS; SUBCUTANEOUS at 06:10

## 2023-09-23 RX ADMIN — Medication 50 MICROGRAM(S): at 06:10

## 2023-09-23 RX ADMIN — Medication 650 MILLIGRAM(S): at 23:03

## 2023-09-23 RX ADMIN — ZINC SULFATE TAB 220 MG (50 MG ZINC EQUIVALENT) 220 MILLIGRAM(S): 220 (50 ZN) TAB at 10:40

## 2023-09-23 RX ADMIN — CEFEPIME 1000 MILLIGRAM(S): 1 INJECTION, POWDER, FOR SOLUTION INTRAMUSCULAR; INTRAVENOUS at 23:02

## 2023-09-23 RX ADMIN — Medication 1 APPLICATION(S): at 23:52

## 2023-09-23 RX ADMIN — SERTRALINE 50 MILLIGRAM(S): 25 TABLET, FILM COATED ORAL at 10:40

## 2023-09-23 RX ADMIN — POLYETHYLENE GLYCOL 3350 17 GRAM(S): 17 POWDER, FOR SOLUTION ORAL at 10:39

## 2023-09-23 RX ADMIN — Medication 81 MILLIGRAM(S): at 10:40

## 2023-09-23 NOTE — PROGRESS NOTE ADULT - SUBJECTIVE AND OBJECTIVE BOX
Date of service: 09-23-23 @ 16:03      pt seen and examined  feels better  no new complaints   no fevers  freitas in place    ROS: no fever or chills; denies dizziness, no HA, no SOB or cough, no abdominal pain, no diarrhea or constipation; no legs pain, no rashes      MEDICATIONS  (STANDING):  acetaminophen     Tablet .. 650 milliGRAM(s) Oral every 8 hours  aspirin enteric coated 81 milliGRAM(s) Oral daily  cefepime  Injectable. 1000 milliGRAM(s) IV Push every 12 hours  cholecalciferol 2000 Unit(s) Oral at bedtime  collagenase Ointment 1 Application(s) Topical three times a day  heparin  Infusion.  Unit(s)/Hr (12 mL/Hr) IV Continuous <Continuous>  levothyroxine 50 MICROGram(s) Oral daily  midodrine 5 milliGRAM(s) Oral every 8 hours  pantoprazole    Tablet 40 milliGRAM(s) Oral before breakfast  polyethylene glycol 3350 17 Gram(s) Oral daily  sertraline 50 milliGRAM(s) Oral daily  sodium chloride 0.9%. 1000 milliLiter(s) (100 mL/Hr) IV Continuous <Continuous>  thiamine 100 milliGRAM(s) Oral at bedtime  zinc sulfate 220 milliGRAM(s) Oral daily      Vital Signs Last 24 Hrs  T(C): 36.5 (23 Sep 2023 09:29), Max: 36.6 (22 Sep 2023 21:15)  T(F): 97.7 (23 Sep 2023 09:29), Max: 97.9 (22 Sep 2023 21:15)  HR: 119 (22 Sep 2023 21:15) (119 - 119)  BP: 148/92 (23 Sep 2023 09:29) (100/60 - 148/92)  BP(mean): --  RR: 18 (23 Sep 2023 09:29) (16 - 18)  SpO2: 100% (23 Sep 2023 09:29) (100% - 100%)    Parameters below as of 23 Sep 2023 09:29  Patient On (Oxygen Delivery Method): room air      PE:  Constitutional: NAD   HEENT: NC/AT, EOMI, PERRLA, conjunctivae clear; ears and nose atraumatic; pharynx benign  Neck: supple; thyroid not palpable  Back: no tenderness  Respiratory: respiratory effort normal; clear to auscultation  Cardiovascular: S1S2 regular, no murmurs  Abdomen: soft, not tender, not distended, positive BS; liver and spleen WNL  Genitourinary: no suprapubic tenderness  Lymphatic: no LN palpable  Musculoskeletal: no muscle tenderness, no joint swelling or tenderness  Extremities: no pedal edema  Neurological/ Psychiatric: AxOx3, Judgement and insight normal;  moving all extremities  Skin: no rashes; no palpable lesions    Labs: all available labs reviewed                                              8.9    13.11 )-----------( 409      ( 23 Sep 2023 06:49 )             27.4     09-23    140  |  112<H>  |  58<H>  ----------------------------<  90  5.0   |  25  |  2.04<H>    Ca    7.7<L>      23 Sep 2023 06:49  Phos  2.8     09-23  Mg     1.8     09-23    TPro  5.7<L>  /  Alb  1.6<L>  /  TBili  0.3  /  DBili  x   /  AST  30  /  ALT  44  /  AlkPhos  99  09-23      Urinalysis Basic - ( 21 Sep 2023 10:11 )    Color: x / Appearance: x / SG: x / pH: x  Gluc: 88 mg/dL / Ketone: x  / Bili: x / Urobili: x   Blood: x / Protein: x / Nitrite: x   Leuk Esterase: x / RBC: x / WBC x   Sq Epi: x / Non Sq Epi: x / Bacteria: x    9/11, 9/14 blood cx growing PSAE; urine cx PSAE       Radiology: all available radiological tests reviewed  < from: CT Abdomen and Pelvis No Cont (09.20.23 @ 21:40) >    ACC: [id number] EXAM:  CT ABDOMEN AND PELVIS   ORDERED BY: TOBI LAWS     PROCEDURE DATE:  09/20/2023          INTERPRETATION:  CLINICAL INFORMATION: Abdominal pain    COMPARISON: None.    CONTRAST/COMPLICATIONS:  IV Contrast: NONE  Oral Contrast: NONE  Complications: None reported at time of study completion    PROCEDURE:  CT of the Abdomen and Pelvis was performed.  Sagittal and coronal reformats were performed.    FINDINGS:  LOWER CHEST: Small bilateral pleural effusions with associated   atelectasis. Small pericardial effusion. Coronary artery calcifications.   Moderate hiatal hernia.    LIVER: Within normal limits.  BILE DUCTS: Normal caliber.  GALLBLADDER: Mural calcification of the gallbladder. Cholelithiasis   without evidence of acute cholecystitis..  SPLEEN: Within normal limits.  PANCREAS: Within normal limits.  ADRENALS: Within normal limits.  KIDNEYS/URETERS: Mild left hydronephrosis with perinephric fat stranding   and urothelial thickening    BLADDER: Mild circumferentialmural thickening of the urinary bladder.   Catheter in place.  REPRODUCTIVE ORGANS: Prostate is enlarged.    BOWEL: No bowel obstruction. Appendix is not visualized. No evidence of   inflammation in the pericecal region. Moderate colonic stool burden.  PERITONEUM: No ascites.  VESSELS: Atherosclerotic changes.  RETROPERITONEUM/LYMPH NODES: No lymphadenopathy.  ABDOMINAL WALL: Diffuse body wall edema.  BONES: No acute osseous abnormality. Degenerative changes in the spine.    IMPRESSION:  Mild left hydronephrosis with perinephric fat stranding and urothelial   thickening. These findings are nonspecific but can be seen in the setting   of pyelonephritis or recently passed stone.    Mild circumferential mural thickening of the urinary bladder, likely   related to chronic outlet obstruction, however superimposed cystitis is   not excluded in the current context.    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    Small bilateral pleural effusions with associated atelectasis.    < end of copied text >    Advanced directives addressed: full resuscitation

## 2023-09-23 NOTE — PROGRESS NOTE ADULT - SUBJECTIVE AND OBJECTIVE BOX
Cheif complaints and Diagnosis: pyelonephritis/ UTI/ hydronephrosis    Subjective:       REVIEW OF SYSTEMS:    CONSTITUTIONAL: No weakness, fevers or chills  EYES/ENT: No visual changes;  No vertigo or throat pain   NECK: No pain or stiffness  RESPIRATORY: No cough, wheezing, hemoptysis; No shortness of breath  CARDIOVASCULAR: No chest pain or palpitations  GASTROINTESTINAL: No abdominal or epigastric pain. No nausea, vomiting, or hematemesis; No diarrhea or constipation. No melena or hematochezia.  GENITOURINARY: No dysuria, frequency or hematuria  NEUROLOGICAL: No numbness or weakness  SKIN: No itching, burning, rashes, or lesions   All other review of systems is negative unless indicated above      Vital Signs Last 24 Hrs  T(C): 36.6 (22 Sep 2023 21:15), Max: 37 (22 Sep 2023 08:50)  T(F): 97.9 (22 Sep 2023 21:15), Max: 98.6 (22 Sep 2023 08:50)  HR: 119 (22 Sep 2023 21:15) (92 - 119)  BP: 100/68 (23 Sep 2023 06:00) (100/60 - 128/94)  BP(mean): --  RR: 16 (22 Sep 2023 21:28) (16 - 18)  SpO2: 100% (22 Sep 2023 21:28) (100% - 100%)    Parameters below as of 22 Sep 2023 21:28  Patient On (Oxygen Delivery Method): room air        HEENT:   pupils equal and reactive, EOMI, no oropharyngeal lesions, erythema, exudates, oral thrush    NECK:   supple, no carotid bruits, no palpable lymph nodes, no thyromegaly    CV:  +S1, +S2, regular, no murmurs or rubs    RESP:   lungs clear to auscultation bilaterally, no wheezing, rales, rhonchi, good air entry bilaterally    BREAST:  not examined    GI:  abdomen soft, non-tender, non-distended, normal BS, no bruits, no abdominal masses, no palpable masses    RECTAL:  not examined    :  not examined    MSK:   normal muscle tone, no atrophy, no rigidity, no contractions    EXT:   no clubbing, no cyanosis, no edema, no calf pain, swelling or erythema    VASCULAR:  pulses equal and symmetric in the upper and lower extremities    NEURO:  AAOX3, no focal neurological deficits, follows all commands, able to move extremities spontaneously    SKIN:  no ulcers, lesions or rashes    MEDICATIONS  (STANDING):  acetaminophen     Tablet .. 650 milliGRAM(s) Oral every 8 hours  aspirin enteric coated 81 milliGRAM(s) Oral daily  cefepime  Injectable. 1000 milliGRAM(s) IV Push every 12 hours  cholecalciferol 2000 Unit(s) Oral at bedtime  collagenase Ointment 1 Application(s) Topical three times a day  heparin  Infusion.  Unit(s)/Hr (12 mL/Hr) IV Continuous <Continuous>  levothyroxine 50 MICROGram(s) Oral daily  midodrine 5 milliGRAM(s) Oral every 8 hours  pantoprazole    Tablet 40 milliGRAM(s) Oral before breakfast  polyethylene glycol 3350 17 Gram(s) Oral daily  sertraline 50 milliGRAM(s) Oral daily  sodium bicarbonate 650 milliGRAM(s) Oral two times a day  sodium chloride 0.9%. 1000 milliLiter(s) (100 mL/Hr) IV Continuous <Continuous>  thiamine 100 milliGRAM(s) Oral at bedtime  zinc sulfate 220 milliGRAM(s) Oral daily    MEDICATIONS  (PRN):  acetaminophen     Tablet .. 650 milliGRAM(s) Oral every 6 hours PRN Temp greater or equal to 38C (100.4F), Mild Pain (1 - 3)  aluminum hydroxide/magnesium hydroxide/simethicone Suspension 30 milliLiter(s) Oral every 4 hours PRN Dyspepsia  bisacodyl 5 milliGRAM(s) Oral every 12 hours PRN Constipation  heparin   Injectable 2500 Unit(s) IV Push every 6 hours PRN For aPTT between 40 - 57  heparin   Injectable 5500 Unit(s) IV Push every 6 hours PRN For aPTT less than 40  melatonin 3 milliGRAM(s) Oral at bedtime PRN Insomnia  ondansetron Injectable 4 milliGRAM(s) IV Push every 8 hours PRN Nausea and/or Vomiting      Urinalysis Basic - ( 23 Sep 2023 06:49 )    Color: x / Appearance: x / SG: x / pH: x  Gluc: 90 mg/dL / Ketone: x  / Bili: x / Urobili: x   Blood: x / Protein: x / Nitrite: x   Leuk Esterase: x / RBC: x / WBC x   Sq Epi: x / Non Sq Epi: x / Bacteria: x    23 Sep 2023 06:49    140    |  112    |  58     ----------------------------<  90     5.0     |  25     |  2.04     Ca    7.7        23 Sep 2023 06:49  Phos  2.8       23 Sep 2023 06:49  Mg     1.8       23 Sep 2023 06:49    TPro  5.7    /  Alb  1.6    /  TBili  0.3    /  DBili  x      /  AST  30     /  ALT  44     /  AlkPhos  99     23 Sep 2023 06:49  LIVER FUNCTIONS - ( 23 Sep 2023 06:49 )  Alb: 1.6 g/dL / Pro: 5.7 gm/dL / ALK PHOS: 99 U/L / ALT: 44 U/L / AST: 30 U/L / GGT: x         PT/INR - ( 21 Sep 2023 20:35 )   PT: 14.2 sec;   INR: 1.27 ratio         PTT - ( 23 Sep 2023 06:49 )  PTT:63.6 secCBC Full  -  ( 23 Sep 2023 06:49 )  WBC Count : 13.11 K/uL  Hemoglobin : 8.9 g/dL  Hematocrit : 27.4 %  Platelet Count - Automated : 409 K/uL  Mean Cell Volume : 93.2 fl  Mean Cell Hemoglobin : 30.3 pg  Mean Cell Hemoglobin Concentration : 32.5 gm/dL  Auto Neutrophil # : x  Auto Lymphocyte # : x  Auto Monocyte # : x  Auto Eosinophil # : x  Auto Basophil # : x  Auto Neutrophil % : x  Auto Lymphocyte % : x  Auto Monocyte % : x  Auto Eosinophil % : x  Auto Basophil % : x  CARDIAC MARKERS ( 21 Sep 2023 10:11 )  x     / x     / 21 U/L / x     / x                PT/INR - ( 21 Sep 2023 20:35 )   PT: 14.2 sec;   INR: 1.27 ratio         PTT - ( 23 Sep 2023 06:49 )  PTT:63.6 sec        Assessment and Plan:   	    72 y/o/m with pmhx of  PMHx of CVA (with left-sided hemiparesis and hemiplegia), dysphagia, and facial weakness, hypotension, rhabdomyolysis, and metabolic encephalopathy , chronic Conklin  BIBEMS from [facility] to ED  on 9/21/23 with c/o worsening intermittent diffuse abdominal pain x 2 weeks. Pt reports he was discharged from hospital 2 weeks to [facility] and since then has mike having abdominal pain. Pt with LUE midline, on antibiotics for pseudomonas infection and bacteremia since 9/13. Reports fevers at home Tmax of 104F. Had diarrhea initially but it has since improved with meds given. Denies cough.   Patient recently has been receiving IV cefepime for pseudomonas bacterimia  Quality indicators: Skin breakdown stage 2 around the sacrum as per nursing /  (l) sided midline /  chronic Conklin ( changed in ED )       Sepsis POA due to PSAE probable left pyelonephritis   Urinary retention with left hydronephrosis   - recent history of bacteremia -  being treated for pseudomonas bacteremia  (MIDLINE L arm)  - this could be secondary to cystitis /  pyelo  - blood cx x2 neg, urine cx - neg ( pt was on IV abx)   - Started on IV antibiotics cefepime 1 gm q12h   - close follow up with Dr. Wilson.    Dyspnea , 2019 novel coronavirus disease (COVID-19).   elevated d-dimers due to Bilateral DVT   Trace pericardial effusion , small bilateral pleural effusions, left base atelectasis vs infiltrate  Moderate to severe MR    - no need for O2 support , - troponin neg   - no shortness of breath.  - CRP 40--> 34,  ferritin 2900--> 4200, d-dimers 620-- 830  - 2 d echo - EF wnl, mod-severe MR  - CXR - small b/l pleural effusion, left base infiltrate vs atelectasis   - doppler LE - + b/l DVT  - supportive care,  pulse O2   - continue heparin drip until renal function improves   - ID follows     h/o CVA (cerebrovascular accident).   - residual deficits  - supportive care.  - c/w ASA, not on statin  - check lipid profile in am - LDL 96     Hypotension.   -  continue with midodrine.    Acute on chronic renal failure.   - c/w IV fluids  - Creatinine Trend: 2.47<--, 2.25<--, 2.53<--  - monitor for improvement  - nephrology consult    Normocytic anemia  - suspected anemia of chronic disease /  no signs of bleeding.    Hypothyroidism   - TSH 10, free T4 low  - start levothyroxine 50 mcg   - o/p follow up    Vitamin D deficiency - replace    Constipation - miralax, dulcolax , monitor bm    Severe protein-calorie malnutrition.   - nutritional education provided   - supplements ordered   - thiamine 100 qd, Zinc, vit D ordered      Weakness - PT evaluation    Advance directives   - FULL code  - emergency contact sister  Marilu  [phone]  left  the message 9/21/23, 9/22    Dispo - IV heparin, IV fluids, IV abx, inpatient monitoring , PT evaluation     Cheif complaints and Diagnosis: pyelonephritis/ UTI/ hydronephrosis    Subjective: no complaints      REVIEW OF SYSTEMS:    CONSTITUTIONAL: No weakness, fevers or chills  EYES/ENT: No visual changes;  No vertigo or throat pain   NECK: No pain or stiffness  RESPIRATORY: No cough, wheezing, hemoptysis; No shortness of breath  CARDIOVASCULAR: No chest pain or palpitations  GASTROINTESTINAL: No abdominal or epigastric pain. No nausea, vomiting, or hematemesis; No diarrhea or constipation. No melena or hematochezia.  GENITOURINARY: No dysuria, frequency or hematuria  NEUROLOGICAL: No numbness or weakness  SKIN: No itching, burning, rashes, or lesions   All other review of systems is negative unless indicated above      Vital Signs Last 24 Hrs  T(C): 36.6 (22 Sep 2023 21:15), Max: 37 (22 Sep 2023 08:50)  T(F): 97.9 (22 Sep 2023 21:15), Max: 98.6 (22 Sep 2023 08:50)  HR: 119 (22 Sep 2023 21:15) (92 - 119)  BP: 100/68 (23 Sep 2023 06:00) (100/60 - 128/94)  BP(mean): --  RR: 16 (22 Sep 2023 21:28) (16 - 18)  SpO2: 100% (22 Sep 2023 21:28) (100% - 100%)    Parameters below as of 22 Sep 2023 21:28  Patient On (Oxygen Delivery Method): room air        HEENT:   pupils equal and reactive, EOMI, no oropharyngeal lesions, erythema, exudates, oral thrush    NECK:   supple, no carotid bruits, no palpable lymph nodes, no thyromegaly    CV:  +S1, +S2, regular, no murmurs or rubs    RESP:   lungs clear to auscultation bilaterally, no wheezing, rales, rhonchi, good air entry bilaterally    BREAST:  not examined    GI:  abdomen soft, non-tender, non-distended, normal BS, no bruits, no abdominal masses, no palpable masses    RECTAL:  not examined    :  not examined    MSK:   normal muscle tone, no atrophy, no rigidity, no contractions    EXT:   no clubbing, no cyanosis, no edema, no calf pain, swelling or erythema    VASCULAR:  pulses equal and symmetric in the upper and lower extremities    NEURO:  AAOX3, no focal neurological deficits, follows all commands, able to move extremities spontaneously    SKIN:  no ulcers, lesions or rashes    MEDICATIONS  (STANDING):  acetaminophen     Tablet .. 650 milliGRAM(s) Oral every 8 hours  aspirin enteric coated 81 milliGRAM(s) Oral daily  cefepime  Injectable. 1000 milliGRAM(s) IV Push every 12 hours  cholecalciferol 2000 Unit(s) Oral at bedtime  collagenase Ointment 1 Application(s) Topical three times a day  heparin  Infusion.  Unit(s)/Hr (12 mL/Hr) IV Continuous <Continuous>  levothyroxine 50 MICROGram(s) Oral daily  midodrine 5 milliGRAM(s) Oral every 8 hours  pantoprazole    Tablet 40 milliGRAM(s) Oral before breakfast  polyethylene glycol 3350 17 Gram(s) Oral daily  sertraline 50 milliGRAM(s) Oral daily  sodium bicarbonate 650 milliGRAM(s) Oral two times a day  sodium chloride 0.9%. 1000 milliLiter(s) (100 mL/Hr) IV Continuous <Continuous>  thiamine 100 milliGRAM(s) Oral at bedtime  zinc sulfate 220 milliGRAM(s) Oral daily    MEDICATIONS  (PRN):  acetaminophen     Tablet .. 650 milliGRAM(s) Oral every 6 hours PRN Temp greater or equal to 38C (100.4F), Mild Pain (1 - 3)  aluminum hydroxide/magnesium hydroxide/simethicone Suspension 30 milliLiter(s) Oral every 4 hours PRN Dyspepsia  bisacodyl 5 milliGRAM(s) Oral every 12 hours PRN Constipation  heparin   Injectable 2500 Unit(s) IV Push every 6 hours PRN For aPTT between 40 - 57  heparin   Injectable 5500 Unit(s) IV Push every 6 hours PRN For aPTT less than 40  melatonin 3 milliGRAM(s) Oral at bedtime PRN Insomnia  ondansetron Injectable 4 milliGRAM(s) IV Push every 8 hours PRN Nausea and/or Vomiting      Urinalysis Basic - ( 23 Sep 2023 06:49 )    Color: x / Appearance: x / SG: x / pH: x  Gluc: 90 mg/dL / Ketone: x  / Bili: x / Urobili: x   Blood: x / Protein: x / Nitrite: x   Leuk Esterase: x / RBC: x / WBC x   Sq Epi: x / Non Sq Epi: x / Bacteria: x    23 Sep 2023 06:49    140    |  112    |  58     ----------------------------<  90     5.0     |  25     |  2.04     Ca    7.7        23 Sep 2023 06:49  Phos  2.8       23 Sep 2023 06:49  Mg     1.8       23 Sep 2023 06:49    TPro  5.7    /  Alb  1.6    /  TBili  0.3    /  DBili  x      /  AST  30     /  ALT  44     /  AlkPhos  99     23 Sep 2023 06:49  LIVER FUNCTIONS - ( 23 Sep 2023 06:49 )  Alb: 1.6 g/dL / Pro: 5.7 gm/dL / ALK PHOS: 99 U/L / ALT: 44 U/L / AST: 30 U/L / GGT: x         PT/INR - ( 21 Sep 2023 20:35 )   PT: 14.2 sec;   INR: 1.27 ratio         PTT - ( 23 Sep 2023 06:49 )  PTT:63.6 secCBC Full  -  ( 23 Sep 2023 06:49 )  WBC Count : 13.11 K/uL  Hemoglobin : 8.9 g/dL  Hematocrit : 27.4 %  Platelet Count - Automated : 409 K/uL  Mean Cell Volume : 93.2 fl  Mean Cell Hemoglobin : 30.3 pg  Mean Cell Hemoglobin Concentration : 32.5 gm/dL  Auto Neutrophil # : x  Auto Lymphocyte # : x  Auto Monocyte # : x  Auto Eosinophil # : x  Auto Basophil # : x  Auto Neutrophil % : x  Auto Lymphocyte % : x  Auto Monocyte % : x  Auto Eosinophil % : x  Auto Basophil % : x  CARDIAC MARKERS ( 21 Sep 2023 10:11 )  x     / x     / 21 U/L / x     / x                PT/INR - ( 21 Sep 2023 20:35 )   PT: 14.2 sec;   INR: 1.27 ratio         PTT - ( 23 Sep 2023 06:49 )  PTT:63.6 sec            Assessment and Plan:   	    72 y/o/m with pmhx of  PMHx of CVA (with left-sided hemiparesis and hemiplegia), dysphagia, and facial weakness, hypotension, rhabdomyolysis, and metabolic encephalopathy , chronic Conklin  BIBEMS from [facility] to ED  on 9/21/23 with c/o worsening intermittent diffuse abdominal pain x 2 weeks. Pt reports he was discharged from hospital 2 weeks to [facility] and since then has mike having abdominal pain. Pt with LUE midline, on antibiotics for pseudomonas infection and bacteremia since 9/13. Reports fevers at home Tmax of 104F. Had diarrhea initially but it has since improved with meds given. Denies cough.   Patient recently has been receiving IV cefepime for pseudomonas bacterimia  Quality indicators: Skin breakdown stage 2 around the sacrum as per nursing /  (l) sided midline /  chronic Conklin ( changed in ED )       Sepsis POA due to PSAE probable left pyelonephritis   Urinary retention with left hydronephrosis   - recent history of bacteremia -  being treated for pseudomonas bacteremia  (MIDLINE L arm)  - this could be secondary to cystitis /  pyelo  - blood cx x2 neg, urine cx - neg ( pt was on IV abx)   - Started on IV antibiotics cefepime 1 gm q12h  x 14 days total  , on DC with midline  - close follow up with Dr. Katie Hickman , 2019 novel coronavirus disease (COVID-19).   elevated d-dimers due to Bilateral DVT   Trace pericardial effusion , small bilateral pleural effusions, left base atelectasis vs infiltrate  Moderate to severe MR    - no need for O2 support , - troponin neg   - no shortness of breath.  - CRP 40--> 34,  ferritin 2900--> 4200, d-dimers 620-- 830  - 2 d echo - EF wnl, mod-severe MR  - CXR - small b/l pleural effusion, left base infiltrate vs atelectasis   - doppler LE - + b/l DVT  - supportive care,  pulse O2   - continue heparin drip until renal function improves   - ID follows     h/o CVA (cerebrovascular accident).   - residual deficits  - supportive care.  - c/w ASA, not on statin  - check lipid profile in am - LDL 96     Hypotension.   -  continue with midodrine.    Acute on chronic renal failure.   - c/w IV fluids  - Creatinine Trend: 2.47<--, 2.25<--, 2.53<--  - monitor for improvement  - nephrology consult    Normocytic anemia  - suspected anemia of chronic disease /  no signs of bleeding.    Hypothyroidism   - TSH 10, free T4 low  - start levothyroxine 50 mcg   - o/p follow up    Vitamin D deficiency - replace    Constipation - miralax, dulcolax , monitor bm    Severe protein-calorie malnutrition.   - nutritional education provided   - supplements ordered   - thiamine 100 qd, Zinc, vit D ordered      Weakness - PT evaluation    Advance directives   - FULL code  - emergency contact sister  Marilu  [phone]  left  the message 9/21/23, 9/22    Dispo - IV heparin, IV fluids, IV abx, inpatient monitoring , PT evaluation

## 2023-09-23 NOTE — PROGRESS NOTE ADULT - SUBJECTIVE AND OBJECTIVE BOX
Patient is a 73y Male who reports no complaints as new per staff/pt       MEDICATIONS  (STANDING):  acetaminophen     Tablet .. 650 milliGRAM(s) Oral every 8 hours  aspirin enteric coated 81 milliGRAM(s) Oral daily  cefepime  Injectable. 1000 milliGRAM(s) IV Push every 12 hours  cholecalciferol 2000 Unit(s) Oral at bedtime  collagenase Ointment 1 Application(s) Topical three times a day  heparin  Infusion.  Unit(s)/Hr (12 mL/Hr) IV Continuous <Continuous>  levothyroxine 50 MICROGram(s) Oral daily  midodrine 5 milliGRAM(s) Oral every 8 hours  pantoprazole    Tablet 40 milliGRAM(s) Oral before breakfast  polyethylene glycol 3350 17 Gram(s) Oral daily  sertraline 50 milliGRAM(s) Oral daily  sodium bicarbonate 650 milliGRAM(s) Oral two times a day  sodium chloride 0.9%. 1000 milliLiter(s) (100 mL/Hr) IV Continuous <Continuous>  thiamine 100 milliGRAM(s) Oral at bedtime  zinc sulfate 220 milliGRAM(s) Oral daily    MEDICATIONS  (PRN):  acetaminophen     Tablet .. 650 milliGRAM(s) Oral every 6 hours PRN Temp greater or equal to 38C (100.4F), Mild Pain (1 - 3)  aluminum hydroxide/magnesium hydroxide/simethicone Suspension 30 milliLiter(s) Oral every 4 hours PRN Dyspepsia  bisacodyl 5 milliGRAM(s) Oral every 12 hours PRN Constipation  heparin   Injectable 2500 Unit(s) IV Push every 6 hours PRN For aPTT between 40 - 57  heparin   Injectable 5500 Unit(s) IV Push every 6 hours PRN For aPTT less than 40  melatonin 3 milliGRAM(s) Oral at bedtime PRN Insomnia  ondansetron Injectable 4 milliGRAM(s) IV Push every 8 hours PRN Nausea and/or Vomiting        T(C): , Max: 36.6 (09-22-23 @ 21:15)  T(F): , Max: 97.9 (09-22-23 @ 21:15)  HR: 119 (09-22-23 @ 21:15)  BP: 100/68 (09-23-23 @ 06:00)  BP(mean): --  RR: 16 (09-22-23 @ 21:28)  SpO2: 100% (09-22-23 @ 21:28)  Wt(kg): --    09-22 @ 07:01  -  09-23 @ 07:00  --------------------------------------------------------  IN: 0 mL / OUT: 1300 mL / NET: -1300 mL          PHYSICAL EXAM:    Constitutional: NAD,frail  HEENT: MM  Neck: No LAD, No JVD  Respiratory: CTAB  Cardiovascular: S1 and S2   Extremities: No peripheral edema  Neurological: Alert  : No Conklin  Skin: No rashes  Access: Not applicable        LABS:                        8.9    13.11 )-----------( 409      ( 23 Sep 2023 06:49 )             27.4     23 Sep 2023 06:49    140    |  112    |  58     ----------------------------<  90     5.0     |  25     |  2.04   22 Sep 2023 05:51    136    |  109    |  67     ----------------------------<  93     4.5     |  22     |  2.47   21 Sep 2023 10:11    141    |  111    |  62     ----------------------------<  88     4.0     |  22     |  2.25   20 Sep 2023 18:40    136    |  108    |  75     ----------------------------<  160    4.5     |  22     |  2.53     Ca    7.7        23 Sep 2023 06:49  Ca    8.0        22 Sep 2023 05:51  Ca    8.1        21 Sep 2023 10:11  Ca    7.9        20 Sep 2023 18:40  Phos  2.8       23 Sep 2023 06:49  Phos  3.1       22 Sep 2023 05:51  Phos  3.7       21 Sep 2023 10:11  Mg     1.8       23 Sep 2023 06:49  Mg     2.1       22 Sep 2023 05:51  Mg     1.9       21 Sep 2023 10:11    TPro  5.7    /  Alb  1.6    /  TBili  0.3    /  DBili  x      /  AST  30     /  ALT  44     /  AlkPhos  99     23 Sep 2023 06:49  TPro  5.7    /  Alb  1.7    /  TBili  0.3    /  DBili  0.1    /  AST  34     /  ALT  48     /  AlkPhos  106    22 Sep 2023 05:51  TPro  5.8    /  Alb  1.7    /  TBili  0.3    /  DBili  x      /  AST  31     /  ALT  50     /  AlkPhos  114    21 Sep 2023 10:11  TPro  5.8    /  Alb  1.7    /  TBili  0.3    /  DBili  x      /  AST  36     /  ALT  57     /  AlkPhos  129    20 Sep 2023 18:40          Urine Studies:  Urinalysis Basic - ( 23 Sep 2023 06:49 )    Color: x / Appearance: x / SG: x / pH: x  Gluc: 90 mg/dL / Ketone: x  / Bili: x / Urobili: x   Blood: x / Protein: x / Nitrite: x   Leuk Esterase: x / RBC: x / WBC x   Sq Epi: x / Non Sq Epi: x / Bacteria: x            RADIOLOGY & ADDITIONAL STUDIES:

## 2023-09-23 NOTE — PROGRESS NOTE ADULT - ASSESSMENT
73 with poor medical compliance (never saw a Dr until recent CVA) CVA (with left-sided hemiparesis and hemiplegia), dysphagia, and facial weakness, hypotension, rhabdomyolysis, and metabolic encephalopathy with history of HIRA recently (nearly requiring HD) with renal follow up of HIRA.     HIRA  -Renal function appears to be stabilizing from last admit at [facility], continues to trend down  -Trend renal panel  -Renally dose meds  -NO nsaids/contrast  -OPtimize intake  -monitor rising K  -Can stop oral bicarb    D/c with RN staff

## 2023-09-23 NOTE — PROGRESS NOTE ADULT - ASSESSMENT
72 y/o/m with pmhx of  PMHx of CVA (with left-sided hemiparesis and hemiplegia), dysphagia, and facial weakness, hypotension, rhabdomyolysis, and metabolic encephalopathy BIBEMS from [facility] to ED c/o worsening intermittent diffuse abdominal pain x 2 weeks. Pt reports he was discharged from hospital [id number] and since then has mike having abdominal pain. Pt with LUE midline, on antibiotics for pseudomonas infection and bacteremia since 9/13. Reports fevers at home Tmax of 104F. Had diarrhea initially but it has since improved with meds given. Denies cough.  Patient recently has been receiving IV cefepime for pseudomonas bacteremia. noted with urine cx, blood cx growing PSAE 9/11 and blood cx positive again 9/14. UA positive, imaging remarkable for Mild left hydronephrosis with perinephric fat stranding and urothelial thickening. These findings are nonspecific but can be seen in the setting of pyelonephritis or recently passed stone. Mild circumferential mural thickening of the urinary bladder, likely related to chronic outlet obstruction.     1. Sepsis with PSAE. L hydronephrosis. L pyelonephritis. UTI. ? Passed stone. HIRA  - imaging reviewed  - blood cx 9/14 9/11 with PSAE, urine cx 9/11 PSAE sensitivities reviewed  - on IV cefepime [id number] #3 s/p merrem #1   - continue with abx coverage  - blood cx no growth 9/20  - monitor temps  - tolerating abx well so far; no side effects noted  - reason for abx use and side effects reviewed with patient  - on dc midline - iv cefepime [id number] 14 days until --10/3  - supportive care  - fu cbc     2. other issues - care per medicine

## 2023-09-24 LAB
ALBUMIN SERPL ELPH-MCNC: 1.6 G/DL — LOW (ref 3.3–5)
ALP SERPL-CCNC: 83 U/L — SIGNIFICANT CHANGE UP (ref 40–120)
ALT FLD-CCNC: 34 U/L — SIGNIFICANT CHANGE UP (ref 12–78)
ANION GAP SERPL CALC-SCNC: 5 MMOL/L — SIGNIFICANT CHANGE UP (ref 5–17)
APTT BLD: 78.5 SEC — HIGH (ref 24.5–35.6)
AST SERPL-CCNC: 24 U/L — SIGNIFICANT CHANGE UP (ref 15–37)
BILIRUB SERPL-MCNC: 0.2 MG/DL — SIGNIFICANT CHANGE UP (ref 0.2–1.2)
BUN SERPL-MCNC: 56 MG/DL — HIGH (ref 7–23)
CALCIUM SERPL-MCNC: 7.9 MG/DL — LOW (ref 8.5–10.1)
CHLORIDE SERPL-SCNC: 112 MMOL/L — HIGH (ref 96–108)
CO2 SERPL-SCNC: 23 MMOL/L — SIGNIFICANT CHANGE UP (ref 22–31)
CREAT SERPL-MCNC: 1.95 MG/DL — HIGH (ref 0.5–1.3)
CRP SERPL-MCNC: 19 MG/L — HIGH
D DIMER BLD IA.RAPID-MCNC: 582 NG/ML DDU — HIGH
EGFR: 36 ML/MIN/1.73M2 — LOW
FERRITIN SERPL-MCNC: 4572 NG/ML — HIGH (ref 30–400)
GLUCOSE SERPL-MCNC: 87 MG/DL — SIGNIFICANT CHANGE UP (ref 70–99)
HCT VFR BLD CALC: 26 % — LOW (ref 39–50)
HGB BLD-MCNC: 8.4 G/DL — LOW (ref 13–17)
MAGNESIUM SERPL-MCNC: 2 MG/DL — SIGNIFICANT CHANGE UP (ref 1.6–2.6)
MCHC RBC-ENTMCNC: 30.3 PG — SIGNIFICANT CHANGE UP (ref 27–34)
MCHC RBC-ENTMCNC: 32.3 GM/DL — SIGNIFICANT CHANGE UP (ref 32–36)
MCV RBC AUTO: 93.9 FL — SIGNIFICANT CHANGE UP (ref 80–100)
NT-PROBNP SERPL-SCNC: 1880 PG/ML — HIGH (ref 0–125)
PHOSPHATE SERPL-MCNC: 2.1 MG/DL — LOW (ref 2.5–4.5)
PLATELET # BLD AUTO: 412 K/UL — HIGH (ref 150–400)
POTASSIUM SERPL-MCNC: 4.7 MMOL/L — SIGNIFICANT CHANGE UP (ref 3.5–5.3)
POTASSIUM SERPL-SCNC: 4.7 MMOL/L — SIGNIFICANT CHANGE UP (ref 3.5–5.3)
PROCALCITONIN SERPL-MCNC: 0.47 NG/ML — HIGH (ref 0.02–0.1)
PROT SERPL-MCNC: 5.1 GM/DL — LOW (ref 6–8.3)
RBC # BLD: 2.77 M/UL — LOW (ref 4.2–5.8)
RBC # FLD: 15.9 % — HIGH (ref 10.3–14.5)
SODIUM SERPL-SCNC: 140 MMOL/L — SIGNIFICANT CHANGE UP (ref 135–145)
WBC # BLD: 10.73 K/UL — HIGH (ref 3.8–10.5)
WBC # FLD AUTO: 10.73 K/UL — HIGH (ref 3.8–10.5)

## 2023-09-24 PROCEDURE — 99232 SBSQ HOSP IP/OBS MODERATE 35: CPT

## 2023-09-24 RX ADMIN — Medication 650 MILLIGRAM(S): at 22:21

## 2023-09-24 RX ADMIN — MIDODRINE HYDROCHLORIDE 5 MILLIGRAM(S): 2.5 TABLET ORAL at 15:43

## 2023-09-24 RX ADMIN — CEFEPIME 1000 MILLIGRAM(S): 1 INJECTION, POWDER, FOR SOLUTION INTRAMUSCULAR; INTRAVENOUS at 22:21

## 2023-09-24 RX ADMIN — Medication 650 MILLIGRAM(S): at 06:13

## 2023-09-24 RX ADMIN — Medication 650 MILLIGRAM(S): at 00:00

## 2023-09-24 RX ADMIN — MIDODRINE HYDROCHLORIDE 5 MILLIGRAM(S): 2.5 TABLET ORAL at 22:21

## 2023-09-24 RX ADMIN — Medication 81 MILLIGRAM(S): at 11:06

## 2023-09-24 RX ADMIN — SERTRALINE 50 MILLIGRAM(S): 25 TABLET, FILM COATED ORAL at 11:06

## 2023-09-24 RX ADMIN — Medication 2000 UNIT(S): at 22:21

## 2023-09-24 RX ADMIN — Medication 1 APPLICATION(S): at 06:24

## 2023-09-24 RX ADMIN — PANTOPRAZOLE SODIUM 40 MILLIGRAM(S): 20 TABLET, DELAYED RELEASE ORAL at 06:13

## 2023-09-24 RX ADMIN — ZINC SULFATE TAB 220 MG (50 MG ZINC EQUIVALENT) 220 MILLIGRAM(S): 220 (50 ZN) TAB at 11:06

## 2023-09-24 RX ADMIN — HEPARIN SODIUM 1000 UNIT(S)/HR: 5000 INJECTION INTRAVENOUS; SUBCUTANEOUS at 07:13

## 2023-09-24 RX ADMIN — Medication 650 MILLIGRAM(S): at 15:43

## 2023-09-24 RX ADMIN — CEFEPIME 1000 MILLIGRAM(S): 1 INJECTION, POWDER, FOR SOLUTION INTRAMUSCULAR; INTRAVENOUS at 11:06

## 2023-09-24 RX ADMIN — Medication 1 APPLICATION(S): at 15:44

## 2023-09-24 RX ADMIN — MIDODRINE HYDROCHLORIDE 5 MILLIGRAM(S): 2.5 TABLET ORAL at 05:42

## 2023-09-24 RX ADMIN — Medication 650 MILLIGRAM(S): at 23:30

## 2023-09-24 RX ADMIN — HEPARIN SODIUM 1000 UNIT(S)/HR: 5000 INJECTION INTRAVENOUS; SUBCUTANEOUS at 19:17

## 2023-09-24 RX ADMIN — Medication 50 MICROGRAM(S): at 05:42

## 2023-09-24 RX ADMIN — Medication 100 MILLIGRAM(S): at 22:21

## 2023-09-24 RX ADMIN — Medication 1 APPLICATION(S): at 22:21

## 2023-09-24 NOTE — PROGRESS NOTE ADULT - ASSESSMENT
72 y/o/m with pmhx of  PMHx of CVA (with left-sided hemiparesis and hemiplegia), dysphagia, and facial weakness, hypotension, rhabdomyolysis, and metabolic encephalopathy BIBEMS from [facility] to ED c/o worsening intermittent diffuse abdominal pain x 2 weeks. Pt reports [facility] and since then has mike having abdominal pain. Pt with LUE midline, on antibiotics for pseudomonas infection and bacteremia since 9/13. Reports fevers at home Tmax of 104F. Had diarrhea initially but it has since improved with meds given. Denies cough.  Patient recently has been receiving IV cefepime for pseudomonas bacteremia. noted with urine cx, blood cx growing PSAE 9/11 and blood cx positive again 9/14. UA positive, imaging remarkable for Mild left hydronephrosis with perinephric fat stranding and urothelial thickening. These findings are nonspecific but can be seen in the setting of pyelonephritis or recently passed stone. Mild circumferential mural thickening of the urinary bladder, likely related to chronic outlet obstruction.     1. Sepsis with PSAE. L hydronephrosis. L pyelonephritis. UTI. ? Passed stone. HIRA  - imaging reviewed  - blood cx 9/14 9/11 with PSAE, urine cx 9/11 PSAE sensitivities reviewed  - on IV cefepime [id number] #4 s/p merrem #1   - continue with abx coverage  - blood cx no growth 9/20  - monitor temps  - tolerating abx well so far; no side effects noted  - reason for abx use and side effects reviewed with patient  - on dc midline - iv cefepime [id number] 14 days until --10/3  - supportive care  - fu cbc     2. other issues - care per medicine

## 2023-09-24 NOTE — PROGRESS NOTE ADULT - SUBJECTIVE AND OBJECTIVE BOX
Patient is a 73y Male who reports no complaints as new       MEDICATIONS  (STANDING):  acetaminophen     Tablet .. 650 milliGRAM(s) Oral every 8 hours  aspirin enteric coated 81 milliGRAM(s) Oral daily  cefepime  Injectable. 1000 milliGRAM(s) IV Push every 12 hours  cholecalciferol 2000 Unit(s) Oral at bedtime  collagenase Ointment 1 Application(s) Topical three times a day  heparin  Infusion.  Unit(s)/Hr (12 mL/Hr) IV Continuous <Continuous>  levothyroxine 50 MICROGram(s) Oral daily  midodrine 5 milliGRAM(s) Oral every 8 hours  pantoprazole    Tablet 40 milliGRAM(s) Oral before breakfast  polyethylene glycol 3350 17 Gram(s) Oral daily  sertraline 50 milliGRAM(s) Oral daily  thiamine 100 milliGRAM(s) Oral at bedtime  zinc sulfate 220 milliGRAM(s) Oral daily    MEDICATIONS  (PRN):  acetaminophen     Tablet .. 650 milliGRAM(s) Oral every 6 hours PRN Temp greater or equal to 38C (100.4F), Mild Pain (1 - 3)  aluminum hydroxide/magnesium hydroxide/simethicone Suspension 30 milliLiter(s) Oral every 4 hours PRN Dyspepsia  bisacodyl 5 milliGRAM(s) Oral every 12 hours PRN Constipation  heparin   Injectable 5500 Unit(s) IV Push every 6 hours PRN For aPTT less than 40  heparin   Injectable 2500 Unit(s) IV Push every 6 hours PRN For aPTT between 40 - 57  melatonin 3 milliGRAM(s) Oral at bedtime PRN Insomnia  ondansetron Injectable 4 milliGRAM(s) IV Push every 8 hours PRN Nausea and/or Vomiting        T(C): , Max: 37.3 (09-23-23 @ 20:17)  T(F): , Max: 99.2 (09-23-23 @ 20:17)  HR: 91 (09-24-23 @ 09:08)  BP: 144/78 (09-24-23 @ 09:08)  BP(mean): --  RR: 18 (09-24-23 @ 09:08)  SpO2: 100% (09-24-23 @ 09:08)  Wt(kg): --    09-23 @ 07:01  -  09-24 @ 07:00  --------------------------------------------------------  IN: 0 mL / OUT: 2000 mL / NET: -2000 mL          PHYSICAL EXAM:    Constitutional: frail, > stated age  HEENT: MM     Cardiovascular: S1 and S2, RRR  Gastrointestinal: BS+, soft, NT/ND  Extremities: No peripheral edema  Neurological: Asleep but arousable         LABS:                        8.4    10.73 )-----------( 412      ( 24 Sep 2023 06:37 )             26.0     24 Sep 2023 06:37    140    |  112    |  56     ----------------------------<  87     4.7     |  23     |  1.95   23 Sep 2023 06:49    140    |  112    |  58     ----------------------------<  90     5.0     |  25     |  2.04   22 Sep 2023 05:51    136    |  109    |  67     ----------------------------<  93     4.5     |  22     |  2.47   21 Sep 2023 10:11    141    |  111    |  62     ----------------------------<  88     4.0     |  22     |  2.25   20 Sep 2023 18:40    136    |  108    |  75     ----------------------------<  160    4.5     |  22     |  2.53     Ca    7.9        24 Sep 2023 06:37  Ca    7.7        23 Sep 2023 06:49  Ca    8.0        22 Sep 2023 05:51  Ca    8.1        21 Sep 2023 10:11  Ca    7.9        20 Sep 2023 18:40  Phos  2.1       24 Sep 2023 06:37  Phos  2.8       23 Sep 2023 06:49  Phos  3.1       22 Sep 2023 05:51  Phos  3.7       21 Sep 2023 10:11  Mg     2.0       24 Sep 2023 06:37  Mg     1.8       23 Sep 2023 06:49  Mg     2.1       22 Sep 2023 05:51  Mg     1.9       21 Sep 2023 10:11    TPro  5.1    /  Alb  1.6    /  TBili  0.2    /  DBili  x      /  AST  24     /  ALT  34     /  AlkPhos  83     24 Sep 2023 06:37  TPro  5.7    /  Alb  1.6    /  TBili  0.3    /  DBili  x      /  AST  30     /  ALT  44     /  AlkPhos  99     23 Sep 2023 06:49  TPro  5.7    /  Alb  1.7    /  TBili  0.3    /  DBili  0.1    /  AST  34     /  ALT  48     /  AlkPhos  106    22 Sep 2023 05:51  TPro  5.8    /  Alb  1.7    /  TBili  0.3    /  DBili  x      /  AST  31     /  ALT  50     /  AlkPhos  114    21 Sep 2023 10:11  TPro  5.8    /  Alb  1.7    /  TBili  0.3    /  DBili  x      /  AST  36     /  ALT  57     /  AlkPhos  129    20 Sep 2023 18:40          Urine Studies:  Urinalysis Basic - ( 24 Sep 2023 06:37 )    Color: x / Appearance: x / SG: x / pH: x  Gluc: 87 mg/dL / Ketone: x  / Bili: x / Urobili: x   Blood: x / Protein: x / Nitrite: x   Leuk Esterase: x / RBC: x / WBC x   Sq Epi: x / Non Sq Epi: x / Bacteria: x            RADIOLOGY & ADDITIONAL STUDIES:

## 2023-09-24 NOTE — PROGRESS NOTE ADULT - SUBJECTIVE AND OBJECTIVE BOX
Cheif complaints and Diagnosis: pyelonephritis/ severe debility    Subjective: no complaints      REVIEW OF SYSTEMS:    CONSTITUTIONAL: No weakness, fevers or chills  EYES/ENT: No visual changes;  No vertigo or throat pain   NECK: No pain or stiffness  RESPIRATORY: No cough, wheezing, hemoptysis; No shortness of breath  CARDIOVASCULAR: No chest pain or palpitations  GASTROINTESTINAL: No abdominal or epigastric pain. No nausea, vomiting, or hematemesis; No diarrhea or constipation. No melena or hematochezia.  GENITOURINARY: No dysuria, frequency or hematuria  NEUROLOGICAL: No numbness or weakness  SKIN: No itching, burning, rashes, or lesions   All other review of systems is negative unless indicated above      Vital Signs Last 24 Hrs  T(C): 36.8 (24 Sep 2023 09:08), Max: 37.3 (23 Sep 2023 20:17)  T(F): 98.3 (24 Sep 2023 09:08), Max: 99.2 (23 Sep 2023 20:17)  HR: 91 (24 Sep 2023 09:08) (90 - 116)  BP: 144/78 (24 Sep 2023 09:08) (106/67 - 144/78)  BP(mean): --  RR: 18 (24 Sep 2023 09:08) (18 - 19)  SpO2: 100% (24 Sep 2023 09:08) (91% - 100%)    Parameters below as of 24 Sep 2023 09:08  Patient On (Oxygen Delivery Method): room air        HEENT:   pupils equal and reactive, EOMI, no oropharyngeal lesions, erythema, exudates, oral thrush    NECK:   supple, no carotid bruits, no palpable lymph nodes, no thyromegaly    CV:  +S1, +S2, regular, no murmurs or rubs    RESP:   lungs clear to auscultation bilaterally, no wheezing, rales, rhonchi, good air entry bilaterally    BREAST:  not examined    GI:  abdomen soft, non-tender, non-distended, normal BS, no bruits, no abdominal masses, no palpable masses    RECTAL:  not examined    :  not examined    MSK:   normal muscle tone, no atrophy, no rigidity, no contractions    EXT:   no clubbing, no cyanosis, no edema, no calf pain, swelling or erythema    VASCULAR:  pulses equal and symmetric in the upper and lower extremities    NEURO:  AAOX3, no focal neurological deficits, follows all commands, able to move extremities spontaneously    SKIN:  no ulcers, lesions or rashes    MEDICATIONS  (STANDING):  acetaminophen     Tablet .. 650 milliGRAM(s) Oral every 8 hours  aspirin enteric coated 81 milliGRAM(s) Oral daily  cefepime  Injectable. 1000 milliGRAM(s) IV Push every 12 hours  cholecalciferol 2000 Unit(s) Oral at bedtime  collagenase Ointment 1 Application(s) Topical three times a day  heparin  Infusion.  Unit(s)/Hr (12 mL/Hr) IV Continuous <Continuous>  levothyroxine 50 MICROGram(s) Oral daily  midodrine 5 milliGRAM(s) Oral every 8 hours  pantoprazole    Tablet 40 milliGRAM(s) Oral before breakfast  polyethylene glycol 3350 17 Gram(s) Oral daily  sertraline 50 milliGRAM(s) Oral daily  sodium chloride 0.9%. 1000 milliLiter(s) (100 mL/Hr) IV Continuous <Continuous>  thiamine 100 milliGRAM(s) Oral at bedtime  zinc sulfate 220 milliGRAM(s) Oral daily    MEDICATIONS  (PRN):  acetaminophen     Tablet .. 650 milliGRAM(s) Oral every 6 hours PRN Temp greater or equal to 38C (100.4F), Mild Pain (1 - 3)  aluminum hydroxide/magnesium hydroxide/simethicone Suspension 30 milliLiter(s) Oral every 4 hours PRN Dyspepsia  bisacodyl 5 milliGRAM(s) Oral every 12 hours PRN Constipation  heparin   Injectable 5500 Unit(s) IV Push every 6 hours PRN For aPTT less than 40  heparin   Injectable 2500 Unit(s) IV Push every 6 hours PRN For aPTT between 40 - 57  melatonin 3 milliGRAM(s) Oral at bedtime PRN Insomnia  ondansetron Injectable 4 milliGRAM(s) IV Push every 8 hours PRN Nausea and/or Vomiting      Urinalysis Basic - ( 24 Sep 2023 06:37 )    Color: x / Appearance: x / SG: x / pH: x  Gluc: 87 mg/dL / Ketone: x  / Bili: x / Urobili: x   Blood: x / Protein: x / Nitrite: x   Leuk Esterase: x / RBC: x / WBC x   Sq Epi: x / Non Sq Epi: x / Bacteria: x    24 Sep 2023 06:37    140    |  112    |  56     ----------------------------<  87     4.7     |  23     |  1.95     Ca    7.9        24 Sep 2023 06:37  Phos  2.1       24 Sep 2023 06:37  Mg     2.0       24 Sep 2023 06:37    TPro  5.1    /  Alb  1.6    /  TBili  0.2    /  DBili  x      /  AST  24     /  ALT  34     /  AlkPhos  83     24 Sep 2023 06:37  LIVER FUNCTIONS - ( 24 Sep 2023 06:37 )  Alb: 1.6 g/dL / Pro: 5.1 gm/dL / ALK PHOS: 83 U/L / ALT: 34 U/L / AST: 24 U/L / GGT: x         PTT - ( 24 Sep 2023 06:37 )  PTT:78.5 secCBC Full  -  ( 24 Sep 2023 06:37 )  WBC Count : 10.73 K/uL  Hemoglobin : 8.4 g/dL  Hematocrit : 26.0 %  Platelet Count - Automated : 412 K/uL  Mean Cell Volume : 93.9 fl  Mean Cell Hemoglobin : 30.3 pg  Mean Cell Hemoglobin Concentration : 32.3 gm/dL            PTT - ( 24 Sep 2023 06:37 )  PTT:78.5 sec              Assessment and Plan:   	    72 y/o/m with pmhx of  PMHx of CVA (with left-sided hemiparesis and hemiplegia), dysphagia, and facial weakness, hypotension, rhabdomyolysis, and metabolic encephalopathy , chronic Conklin  BIBEMS from [facility] to ED  on 9/21/23 with c/o worsening intermittent diffuse abdominal pain x 2 weeks. Pt reports [facility] and since then has mike having abdominal pain. Pt with LUE midline, on antibiotics for pseudomonas infection and bacteremia since 9/13. Reports fevers at home Tmax of 104F. Had diarrhea initially but it has since improved with meds given. Denies cough.   Patient recently has been receiving IV cefepime for pseudomonas bacterimia  Quality indicators: Skin breakdown stage 2 around the sacrum as per nursing /  (l) sided midline /  chronic Conklin ( changed in ED )       Sepsis POA due to PSAE probable left pyelonephritis   Urinary retention with left hydronephrosis   - recent history of bacteremia -  being treated for pseudomonas bacteremia  (MIDLINE L arm)  - this could be secondary to cystitis /  pyelo  - blood cx x2 neg, urine cx - neg ( pt was on IV abx)   - Started on IV antibiotics cefepime 1 gm q12h  x 14 days total  , on DC with midline  - close follow up with Dr. Katie Hickman , 2019 novel coronavirus disease (COVID-19).   elevated d-dimers due to Bilateral DVT   Trace pericardial effusion , small bilateral pleural effusions, left base atelectasis vs infiltrate  Moderate to severe MR    - no need for O2 support , - troponin neg   - no shortness of breath.  - CRP 40--> 34,  ferritin 2900--> 4200, d-dimers 620-- 830  - 2 d echo - EF wnl, mod-severe MR  - CXR - small b/l pleural effusion, left base infiltrate vs atelectasis   - doppler LE - + b/l DVT  - supportive care,  pulse O2   - continue heparin drip until renal function improves   - ID follows     h/o CVA (cerebrovascular accident).   - residual deficits  - supportive care.  - c/w ASA, not on statin  - check lipid profile in am - LDL 96     Hypotension.   -  continue with midodrine.    Acute on chronic renal failure.   - c/w IV fluids  - Creatinine Trend: 2.47<--, 2.25<--, 2.53<--  - monitor for improvement  - nephrology consult    Normocytic anemia  - suspected anemia of chronic disease /  no signs of bleeding.    Hypothyroidism   - TSH 10, free T4 low  - start levothyroxine 50 mcg   - o/p follow up    Vitamin D deficiency - replace    Constipation - miralax, dulcolax , monitor bm    Severe protein-calorie malnutrition.   - nutritional education provided   - supplements ordered   - thiamine 100 qd, Zinc, vit D ordered      Weakness - PT evaluation    Advance directives   - FULL code  - emergency contact sister  [place]  [phone]  left  the message 9/21/23, 9/22    Dispo -patient with severe debility, mostly bedbound , can not ambulate. Will need arragments Monday with casemangement/socialwork, and Midline placement for IV abx 14 days total.

## 2023-09-24 NOTE — PROGRESS NOTE ADULT - SUBJECTIVE AND OBJECTIVE BOX
Date of service: 09-24-23 @ 15:37    pt seen and examined  feels better  no fevers  freitas in place    ROS: no fever or chills; denies dizziness, no HA, no SOB or cough, no abdominal pain, no diarrhea or constipation; no legs pain, no rashes      MEDICATIONS  (STANDING):  acetaminophen     Tablet .. 650 milliGRAM(s) Oral every 8 hours  aspirin enteric coated 81 milliGRAM(s) Oral daily  cefepime  Injectable. 1000 milliGRAM(s) IV Push every 12 hours  cholecalciferol 2000 Unit(s) Oral at bedtime  collagenase Ointment 1 Application(s) Topical three times a day  heparin  Infusion.  Unit(s)/Hr (12 mL/Hr) IV Continuous <Continuous>  levothyroxine 50 MICROGram(s) Oral daily  midodrine 5 milliGRAM(s) Oral every 8 hours  pantoprazole    Tablet 40 milliGRAM(s) Oral before breakfast  polyethylene glycol 3350 17 Gram(s) Oral daily  sertraline 50 milliGRAM(s) Oral daily  thiamine 100 milliGRAM(s) Oral at bedtime  zinc sulfate 220 milliGRAM(s) Oral daily      Vital Signs Last 24 Hrs  T(C): 36.8 (24 Sep 2023 09:08), Max: 37.3 (23 Sep 2023 20:17)  T(F): 98.3 (24 Sep 2023 09:08), Max: 99.2 (23 Sep 2023 20:17)  HR: 91 (24 Sep 2023 09:08) (90 - 116)  BP: 144/78 (24 Sep 2023 09:08) (121/75 - 144/78)  BP(mean): --  RR: 18 (24 Sep 2023 09:08) (18 - 19)  SpO2: 100% (24 Sep 2023 09:08) (91% - 100%)    Parameters below as of 24 Sep 2023 09:08  Patient On (Oxygen Delivery Method): room air        PE:  Constitutional: NAD   HEENT: NC/AT, EOMI, PERRLA, conjunctivae clear; ears and nose atraumatic; pharynx benign  Neck: supple; thyroid not palpable  Back: no tenderness  Respiratory: respiratory effort normal; clear to auscultation  Cardiovascular: S1S2 regular, no murmurs  Abdomen: soft, not tender, not distended, positive BS; liver and spleen WNL  Genitourinary: no suprapubic tenderness  Lymphatic: no LN palpable  Musculoskeletal: no muscle tenderness, no joint swelling or tenderness  Extremities: no pedal edema  Neurological/ Psychiatric: AxOx3, Judgement and insight normal;  moving all extremities  Skin: no rashes; no palpable lesions    Labs: all available labs reviewed                                   8.4    10.73 )-----------( 412      ( 24 Sep 2023 06:37 )             26.0     09-24    140  |  112<H>  |  56<H>  ----------------------------<  87  4.7   |  23  |  1.95<H>    Ca    7.9<L>      24 Sep 2023 06:37  Phos  2.1     09-24  Mg     2.0     09-24    TPro  5.1<L>  /  Alb  1.6<L>  /  TBili  0.2  /  DBili  x   /  AST  24  /  ALT  34  /  AlkPhos  83  09-24          Urinalysis Basic - ( 21 Sep 2023 10:11 )    Color: x / Appearance: x / SG: x / pH: x  Gluc: 88 mg/dL / Ketone: x  / Bili: x / Urobili: x   Blood: x / Protein: x / Nitrite: x   Leuk Esterase: x / RBC: x / WBC x   Sq Epi: x / Non Sq Epi: x / Bacteria: x    9/11, 9/14 blood cx growing PSAE; urine cx PSAE       Radiology: all available radiological tests reviewed  < from: CT Abdomen and Pelvis No Cont (09.20.23 @ 21:40) >    ACC: [id number] EXAM:  CT ABDOMEN AND PELVIS   ORDERED BY: TOBI LAWS     PROCEDURE DATE:  09/20/2023          INTERPRETATION:  CLINICAL INFORMATION: Abdominal pain    COMPARISON: None.    CONTRAST/COMPLICATIONS:  IV Contrast: NONE  Oral Contrast: NONE  Complications: None reported at time of study completion    PROCEDURE:  CT of the Abdomen and Pelvis was performed.  Sagittal and coronal reformats were performed.    FINDINGS:  LOWER CHEST: Small bilateral pleural effusions with associated   atelectasis. Small pericardial effusion. Coronary artery calcifications.   Moderate hiatal hernia.    LIVER: Within normal limits.  BILE DUCTS: Normal caliber.  GALLBLADDER: Mural calcification of the gallbladder. Cholelithiasis   without evidence of acute cholecystitis..  SPLEEN: Within normal limits.  PANCREAS: Within normal limits.  ADRENALS: Within normal limits.  KIDNEYS/URETERS: Mild left hydronephrosis with perinephric fat stranding   and urothelial thickening    BLADDER: Mild circumferentialmural thickening of the urinary bladder.   Catheter in place.  REPRODUCTIVE ORGANS: Prostate is enlarged.    BOWEL: No bowel obstruction. Appendix is not visualized. No evidence of   inflammation in the pericecal region. Moderate colonic stool burden.  PERITONEUM: No ascites.  VESSELS: Atherosclerotic changes.  RETROPERITONEUM/LYMPH NODES: No lymphadenopathy.  ABDOMINAL WALL: Diffuse body wall edema.  BONES: No acute osseous abnormality. Degenerative changes in the spine.    IMPRESSION:  Mild left hydronephrosis with perinephric fat stranding and urothelial   thickening. These findings are nonspecific but can be seen in the setting   of pyelonephritis or recently passed stone.    Mild circumferential mural thickening of the urinary bladder, likely   related to chronic outlet obstruction, however superimposed cystitis is   not excluded in the current context.    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    Small bilateral pleural effusions with associated atelectasis.    < end of copied text >    Advanced directives addressed: full resuscitation

## 2023-09-24 NOTE — PROGRESS NOTE ADULT - ASSESSMENT
73 with poor medical compliance (never saw a Dr until recent CVA) CVA (with left-sided hemiparesis and hemiplegia), dysphagia, and facial weakness, hypotension, rhabdomyolysis, and metabolic encephalopathy with history of HIRA recently (nearly requiring HD) with renal follow up of HIRA.     HIRA  -Renal function appears to be stabilizing from last admit at [facility], continues to trend down  -No need for IVF now, optimize intake  -NO nsaids/contrast  -Now off oral bicarb    D/c with RN staff

## 2023-09-25 LAB
ANION GAP SERPL CALC-SCNC: 3 MMOL/L — LOW (ref 5–17)
APTT BLD: 60.2 SEC — HIGH (ref 24.5–35.6)
BUN SERPL-MCNC: 62 MG/DL — HIGH (ref 7–23)
CALCIUM SERPL-MCNC: 8.3 MG/DL — LOW (ref 8.5–10.1)
CHLORIDE SERPL-SCNC: 112 MMOL/L — HIGH (ref 96–108)
CO2 SERPL-SCNC: 25 MMOL/L — SIGNIFICANT CHANGE UP (ref 22–31)
CREAT SERPL-MCNC: 1.91 MG/DL — HIGH (ref 0.5–1.3)
EGFR: 37 ML/MIN/1.73M2 — LOW
GLUCOSE SERPL-MCNC: 95 MG/DL — SIGNIFICANT CHANGE UP (ref 70–99)
HCT VFR BLD CALC: 25.9 % — LOW (ref 39–50)
HGB BLD-MCNC: 8.3 G/DL — LOW (ref 13–17)
MCHC RBC-ENTMCNC: 30.1 PG — SIGNIFICANT CHANGE UP (ref 27–34)
MCHC RBC-ENTMCNC: 32 GM/DL — SIGNIFICANT CHANGE UP (ref 32–36)
MCV RBC AUTO: 93.8 FL — SIGNIFICANT CHANGE UP (ref 80–100)
PLATELET # BLD AUTO: 376 K/UL — SIGNIFICANT CHANGE UP (ref 150–400)
POTASSIUM SERPL-MCNC: 5.6 MMOL/L — HIGH (ref 3.5–5.3)
POTASSIUM SERPL-SCNC: 5.6 MMOL/L — HIGH (ref 3.5–5.3)
RBC # BLD: 2.76 M/UL — LOW (ref 4.2–5.8)
RBC # FLD: 16.1 % — HIGH (ref 10.3–14.5)
SODIUM SERPL-SCNC: 140 MMOL/L — SIGNIFICANT CHANGE UP (ref 135–145)
WBC # BLD: 10.98 K/UL — HIGH (ref 3.8–10.5)
WBC # FLD AUTO: 10.98 K/UL — HIGH (ref 3.8–10.5)

## 2023-09-25 PROCEDURE — 99232 SBSQ HOSP IP/OBS MODERATE 35: CPT

## 2023-09-25 RX ORDER — APIXABAN 2.5 MG/1
10 TABLET, FILM COATED ORAL EVERY 12 HOURS
Refills: 0 | Status: DISCONTINUED | OUTPATIENT
Start: 2023-09-25 | End: 2023-09-28

## 2023-09-25 RX ADMIN — Medication 650 MILLIGRAM(S): at 22:41

## 2023-09-25 RX ADMIN — Medication 650 MILLIGRAM(S): at 13:35

## 2023-09-25 RX ADMIN — HEPARIN SODIUM 1000 UNIT(S)/HR: 5000 INJECTION INTRAVENOUS; SUBCUTANEOUS at 07:17

## 2023-09-25 RX ADMIN — Medication 650 MILLIGRAM(S): at 14:46

## 2023-09-25 RX ADMIN — Medication 1 APPLICATION(S): at 06:04

## 2023-09-25 RX ADMIN — APIXABAN 10 MILLIGRAM(S): 2.5 TABLET, FILM COATED ORAL at 19:02

## 2023-09-25 RX ADMIN — PANTOPRAZOLE SODIUM 40 MILLIGRAM(S): 20 TABLET, DELAYED RELEASE ORAL at 06:05

## 2023-09-25 RX ADMIN — MIDODRINE HYDROCHLORIDE 5 MILLIGRAM(S): 2.5 TABLET ORAL at 13:34

## 2023-09-25 RX ADMIN — Medication 50 MICROGRAM(S): at 06:05

## 2023-09-25 RX ADMIN — Medication 81 MILLIGRAM(S): at 09:33

## 2023-09-25 RX ADMIN — Medication 650 MILLIGRAM(S): at 06:05

## 2023-09-25 RX ADMIN — Medication 100 MILLIGRAM(S): at 22:41

## 2023-09-25 RX ADMIN — Medication 1 APPLICATION(S): at 22:42

## 2023-09-25 RX ADMIN — Medication 3 MILLIGRAM(S): at 22:42

## 2023-09-25 RX ADMIN — MIDODRINE HYDROCHLORIDE 5 MILLIGRAM(S): 2.5 TABLET ORAL at 06:05

## 2023-09-25 RX ADMIN — Medication 1 APPLICATION(S): at 13:17

## 2023-09-25 RX ADMIN — Medication 650 MILLIGRAM(S): at 23:30

## 2023-09-25 RX ADMIN — Medication 2000 UNIT(S): at 22:41

## 2023-09-25 RX ADMIN — SERTRALINE 50 MILLIGRAM(S): 25 TABLET, FILM COATED ORAL at 09:34

## 2023-09-25 RX ADMIN — HEPARIN SODIUM 1000 UNIT(S)/HR: 5000 INJECTION INTRAVENOUS; SUBCUTANEOUS at 08:07

## 2023-09-25 RX ADMIN — CEFEPIME 1000 MILLIGRAM(S): 1 INJECTION, POWDER, FOR SOLUTION INTRAMUSCULAR; INTRAVENOUS at 22:42

## 2023-09-25 RX ADMIN — POLYETHYLENE GLYCOL 3350 17 GRAM(S): 17 POWDER, FOR SOLUTION ORAL at 09:34

## 2023-09-25 RX ADMIN — MIDODRINE HYDROCHLORIDE 5 MILLIGRAM(S): 2.5 TABLET ORAL at 22:41

## 2023-09-25 RX ADMIN — CEFEPIME 1000 MILLIGRAM(S): 1 INJECTION, POWDER, FOR SOLUTION INTRAMUSCULAR; INTRAVENOUS at 09:31

## 2023-09-25 RX ADMIN — ZINC SULFATE TAB 220 MG (50 MG ZINC EQUIVALENT) 220 MILLIGRAM(S): 220 (50 ZN) TAB at 09:34

## 2023-09-25 NOTE — PHYSICAL THERAPY INITIAL EVALUATION ADULT - GENERAL OBSERVATIONS, REHAB EVAL
Pt. received supine in isolation room + tele + IV presents emotional. Bed mob SBA sat by EOB, attempted sit to stand to RW with Max A from PT but Pt. was not participating. Back to bed supine with Min A. RN made aware. Pt. received supine in isolation room + tele + IV + freitas presents emotional. Bed mob SBA sat by EOB, attempted sit to stand to RW with Max A from PT but Pt. was not participating. Back to bed supine with Min A. RN made aware.

## 2023-09-25 NOTE — PHYSICAL THERAPY INITIAL EVALUATION ADULT - PERTINENT HX OF CURRENT PROBLEM, REHAB EVAL
74 y/o/m with pmhx of  PMHx of CVA (with left-sided hemiparesis and hemiplegia), dysphagia, and facial weakness, hypotension, rhabdomyolysis, HIRA recently (nearly requiring HD) and metabolic encephalopathy BIBEMS from [facility] to ED c/o worsening intermittent diffuse abdominal pain x 2 weeks. Pt reports he was discharged from hospital 2 weeks to [facility] rehab and since then has mike having abdominal pain.  doppler LE - + b/l DVT. s/p pyelonephritis/ severe debility

## 2023-09-25 NOTE — PROGRESS NOTE ADULT - SUBJECTIVE AND OBJECTIVE BOX
[facility] complaints and Diagnosis: pyelonephritis/ severe debility    Subjective: no complaints  9/25: sitting up in bed eating. no pain or discomfort. no back pain or discomfort. no leg swelling or discomfort. tolerating IV heparin and IV cefepime     REVIEW OF SYSTEMS:    CONSTITUTIONAL: No weakness, fevers or chills  EYES/ENT: No visual changes;  No vertigo or throat pain   NECK: No pain or stiffness  RESPIRATORY: No cough, wheezing, hemoptysis; No shortness of breath  CARDIOVASCULAR: No chest pain or palpitations  GASTROINTESTINAL: No abdominal or epigastric pain. No nausea, vomiting, or hematemesis; No diarrhea or constipation. No melena or hematochezia.  GENITOURINARY: No dysuria, frequency or hematuria  NEUROLOGICAL: No numbness or weakness  SKIN: No itching, burning, rashes, or lesions   All other review of systems is negative unless indicated above      Vital Signs Last 24 Hrs  T(C): 37 (25 Sep 2023 08:42), Max: 37 (25 Sep 2023 08:42)  T(F): 98.6 (25 Sep 2023 08:42), Max: 98.6 (25 Sep 2023 08:42)  HR: 77 (25 Sep 2023 13:40) (76 - 109)  BP: 131/76 (25 Sep 2023 13:40) (111/68 - 158/87)  BP(mean): --  RR: 19 (25 Sep 2023 08:42) (18 - 19)  SpO2: 100% (25 Sep 2023 08:42) (99% - 100%)  room air          HEENT:   pupils equal and reactive, EOMI, no oropharyngeal lesions, erythema, exudates, oral thrush    NECK:   supple, no carotid bruits, no palpable lymph nodes, no thyromegaly    CV:  +S1, +S2, regular, no murmurs or rubs    RESP:   lungs clear to auscultation bilaterally, no wheezing, rales, rhonchi, good air entry bilaterally    BREAST:  not examined    GI:  abdomen soft, non-tender, non-distended, normal BS, no bruits, no abdominal masses, no palpable masses    RECTAL:  not examined    :  freitas cath draining clear yellow urine     MSK:   normal muscle tone, no atrophy, no rigidity, no contractions    EXT:   no clubbing, no cyanosis, no edema, no calf pain, swelling or erythema    VASCULAR:  pulses equal and symmetric in the upper and lower extremities    NEURO:  AAOX3, no focal neurological deficits, follows all commands, able to move extremities spontaneously    SKIN:  no ulcers, lesions or rashes        MEDICATIONS  (STANDING):  acetaminophen     Tablet .. 650 milliGRAM(s) Oral every 8 hours  aspirin enteric coated 81 milliGRAM(s) Oral daily  cefepime  Injectable. 1000 milliGRAM(s) IV Push every 12 hours  cholecalciferol 2000 Unit(s) Oral at bedtime  collagenase Ointment 1 Application(s) Topical three times a day  heparin  Infusion.  Unit(s)/Hr (12 mL/Hr) IV Continuous <Continuous>  levothyroxine 50 MICROGram(s) Oral daily  midodrine 5 milliGRAM(s) Oral every 8 hours  pantoprazole    Tablet 40 milliGRAM(s) Oral before breakfast  polyethylene glycol 3350 17 Gram(s) Oral daily  sertraline 50 milliGRAM(s) Oral daily  sodium chloride 0.9%. 1000 milliLiter(s) (100 mL/Hr) IV Continuous <Continuous>  thiamine 100 milliGRAM(s) Oral at bedtime  zinc sulfate 220 milliGRAM(s) Oral daily    MEDICATIONS  (PRN):  acetaminophen     Tablet .. 650 milliGRAM(s) Oral every 6 hours PRN Temp greater or equal to 38C (100.4F), Mild Pain (1 - 3)  aluminum hydroxide/magnesium hydroxide/simethicone Suspension 30 milliLiter(s) Oral every 4 hours PRN Dyspepsia  bisacodyl 5 milliGRAM(s) Oral every 12 hours PRN Constipation  heparin   Injectable 5500 Unit(s) IV Push every 6 hours PRN For aPTT less than 40  heparin   Injectable 2500 Unit(s) IV Push every 6 hours PRN For aPTT between 40 - 57  melatonin 3 milliGRAM(s) Oral at bedtime PRN Insomnia  ondansetron Injectable 4 milliGRAM(s) IV Push every 8 hours PRN Nausea and/or Vomiting      Urinalysis Basic - ( 24 Sep 2023 06:37 )    Color: x / Appearance: x / SG: x / pH: x  Gluc: 87 mg/dL / Ketone: x  / Bili: x / Urobili: x   Blood: x / Protein: x / Nitrite: x   Leuk Esterase: x / RBC: x / WBC x   Sq Epi: x / Non Sq Epi: x / Bacteria: x    24 Sep 2023 06:37    140    |  112    |  56     ----------------------------<  87     4.7     |  23     |  1.95     Ca    7.9        24 Sep 2023 06:37  Phos  2.1       24 Sep 2023 06:37  Mg     2.0       24 Sep 2023 06:37    TPro  5.1    /  Alb  1.6    /  TBili  0.2    /  DBili  x      /  AST  24     /  ALT  34     /  AlkPhos  83     24 Sep 2023 06:37  LIVER FUNCTIONS - ( 24 Sep 2023 06:37 )  Alb: 1.6 g/dL / Pro: 5.1 gm/dL / ALK PHOS: 83 U/L / ALT: 34 U/L / AST: 24 U/L / GGT: x         PTT - ( 24 Sep 2023 06:37 )  PTT:78.5 secCBC Full  -  ( 24 Sep 2023 06:37 )  WBC Count : 10.73 K/uL  Hemoglobin : 8.4 g/dL  Hematocrit : 26.0 %  Platelet Count - Automated : 412 K/uL  Mean Cell Volume : 93.9 fl  Mean Cell Hemoglobin : 30.3 pg  Mean Cell Hemoglobin Concentration : 32.3 gm/dL            PTT - ( 24 Sep 2023 06:37 )  PTT:78.5 sec              Assessment and Plan:   	    72 y/o/m with pmhx of  PMHx of CVA (with left-sided hemiparesis and hemiplegia), dysphagia, and facial weakness, hypotension, rhabdomyolysis, and metabolic encephalopathy , chronic Freitas  BIBEMS from [facility] to ED  on 9/21/23 with c/o worsening intermittent diffuse abdominal pain x 2 weeks. Pt reports [facility] and since then has mike having abdominal pain. Pt with LUE midline, on antibiotics for pseudomonas infection and bacteremia since 9/13. Reports fevers at home Tmax of 104F. Had diarrhea initially but it has since improved with meds given. Denies cough.   Patient recently has been receiving IV cefepime for pseudomonas bacterimia  Quality indicators: Skin breakdown stage 2 around the sacrum as per nursing /  (l) sided midline /  chronic Freitas ( changed in ED )       Sepsis POA due to PSAE probable left pyelonephritis   Urinary retention with left hydronephrosis   - recent history of bacteremia -  being treated for pseudomonas bacteremia  (MIDLINE L arm)  - this could be secondary to cystitis /  pyelo  - blood cx x2 neg, urine cx - neg ( pt was on IV abx)   - Started on IV antibiotics cefepime 1 gm q12h  x 14 days total  , on DC with midline  - close follow up with Dr. Wilson    Dyspnea , 2019 novel coronavirus disease (COVID-19).   elevated d-dimers due to Bilateral DVT   Trace pericardial effusion , small bilateral pleural effusions, left base atelectasis vs infiltrate  Moderate to severe MR    - no need for O2 support , - troponin neg   - no shortness of breath.  - CRP 40--> 34,  ferritin 2900--> 4200, d-dimers 620-- 830  - 2 d echo - EF wnl, mod-severe MR  - CXR - small b/l pleural effusion, left base infiltrate vs atelectasis   - doppler LE - + b/l DVT  - supportive care,  pulse O2   - heparin gtt dc'd --- switched to eliquis bid starting tonight.   - ID follows     h/o CVA (cerebrovascular accident).   - residual deficits  - supportive care.  - c/w ASA, not on statin  - check lipid profile in am - LDL 96     Hypotension.   -  continue with midodrine.    Acute on chronic renal failure.   - c/w IV fluids  - Creatinine Trend: 2.47<--, 2.25<--, 2.53<--  - monitor for improvement  - nephrology consult    Normocytic anemia  - suspected anemia of chronic disease /  no signs of bleeding.    Hypothyroidism   - TSH 10, free T4 low  - start levothyroxine 50 mcg   - o/p follow up    Vitamin D deficiency - replace    Constipation - miralax, dulcolax , monitor bm    Severe protein-calorie malnutrition.   - nutritional education provided   - supplements ordered   - thiamine 100 qd, Zinc, vit D ordered      Weakness - PT evaluation    Advance directives   - FULL code  - emergency contact sister  Marilu  [phone]  left  the message 9/21/23, 9/22    Dispo -patient with severe debility, mostly bedbound , can not ambulate. Will need casemangement/socialwork, and Midline placement for IV abx 14 days total and

## 2023-09-25 NOTE — PHYSICAL THERAPY INITIAL EVALUATION ADULT - ADDITIONAL COMMENTS
Ha MEYER Pt. came from [facility], prior to first hospitalization[facility] with [facility].

## 2023-09-26 LAB
ALBUMIN SERPL ELPH-MCNC: 1.9 G/DL — LOW (ref 3.3–5)
ALP SERPL-CCNC: 89 U/L — SIGNIFICANT CHANGE UP (ref 40–120)
ALT FLD-CCNC: 55 U/L — SIGNIFICANT CHANGE UP (ref 12–78)
ANION GAP SERPL CALC-SCNC: 3 MMOL/L — LOW (ref 5–17)
APTT BLD: 36.4 SEC — HIGH (ref 24.5–35.6)
AST SERPL-CCNC: 36 U/L — SIGNIFICANT CHANGE UP (ref 15–37)
BILIRUB SERPL-MCNC: 0.3 MG/DL — SIGNIFICANT CHANGE UP (ref 0.2–1.2)
BUN SERPL-MCNC: 64 MG/DL — HIGH (ref 7–23)
CALCIUM SERPL-MCNC: 8.6 MG/DL — SIGNIFICANT CHANGE UP (ref 8.5–10.1)
CHLORIDE SERPL-SCNC: 109 MMOL/L — HIGH (ref 96–108)
CO2 SERPL-SCNC: 25 MMOL/L — SIGNIFICANT CHANGE UP (ref 22–31)
CREAT SERPL-MCNC: 2.04 MG/DL — HIGH (ref 0.5–1.3)
CULTURE RESULTS: SIGNIFICANT CHANGE UP
CULTURE RESULTS: SIGNIFICANT CHANGE UP
EGFR: 34 ML/MIN/1.73M2 — LOW
GLUCOSE SERPL-MCNC: 92 MG/DL — SIGNIFICANT CHANGE UP (ref 70–99)
HCT VFR BLD CALC: 26.5 % — LOW (ref 39–50)
HGB BLD-MCNC: 8.5 G/DL — LOW (ref 13–17)
MCHC RBC-ENTMCNC: 30.2 PG — SIGNIFICANT CHANGE UP (ref 27–34)
MCHC RBC-ENTMCNC: 32.1 GM/DL — SIGNIFICANT CHANGE UP (ref 32–36)
MCV RBC AUTO: 94.3 FL — SIGNIFICANT CHANGE UP (ref 80–100)
PLATELET # BLD AUTO: 357 K/UL — SIGNIFICANT CHANGE UP (ref 150–400)
POTASSIUM SERPL-MCNC: 5.7 MMOL/L — HIGH (ref 3.5–5.3)
POTASSIUM SERPL-SCNC: 5.7 MMOL/L — HIGH (ref 3.5–5.3)
PROT SERPL-MCNC: 5.8 GM/DL — LOW (ref 6–8.3)
RBC # BLD: 2.81 M/UL — LOW (ref 4.2–5.8)
RBC # FLD: 16.2 % — HIGH (ref 10.3–14.5)
SODIUM SERPL-SCNC: 137 MMOL/L — SIGNIFICANT CHANGE UP (ref 135–145)
SPECIMEN SOURCE: SIGNIFICANT CHANGE UP
SPECIMEN SOURCE: SIGNIFICANT CHANGE UP
WBC # BLD: 9.45 K/UL — SIGNIFICANT CHANGE UP (ref 3.8–10.5)
WBC # FLD AUTO: 9.45 K/UL — SIGNIFICANT CHANGE UP (ref 3.8–10.5)

## 2023-09-26 PROCEDURE — 99232 SBSQ HOSP IP/OBS MODERATE 35: CPT

## 2023-09-26 RX ORDER — COLLAGENASE CLOSTRIDIUM HIST. 250 UNIT/G
1 OINTMENT (GRAM) TOPICAL DAILY
Refills: 0 | Status: DISCONTINUED | OUTPATIENT
Start: 2023-09-26 | End: 2023-09-28

## 2023-09-26 RX ADMIN — Medication 1 APPLICATION(S): at 10:57

## 2023-09-26 RX ADMIN — Medication 650 MILLIGRAM(S): at 22:26

## 2023-09-26 RX ADMIN — CEFEPIME 1000 MILLIGRAM(S): 1 INJECTION, POWDER, FOR SOLUTION INTRAMUSCULAR; INTRAVENOUS at 22:27

## 2023-09-26 RX ADMIN — SERTRALINE 50 MILLIGRAM(S): 25 TABLET, FILM COATED ORAL at 10:58

## 2023-09-26 RX ADMIN — MIDODRINE HYDROCHLORIDE 5 MILLIGRAM(S): 2.5 TABLET ORAL at 13:38

## 2023-09-26 RX ADMIN — Medication 81 MILLIGRAM(S): at 10:57

## 2023-09-26 RX ADMIN — Medication 2000 UNIT(S): at 22:27

## 2023-09-26 RX ADMIN — Medication 100 MILLIGRAM(S): at 22:27

## 2023-09-26 RX ADMIN — ZINC SULFATE TAB 220 MG (50 MG ZINC EQUIVALENT) 220 MILLIGRAM(S): 220 (50 ZN) TAB at 10:57

## 2023-09-26 RX ADMIN — PANTOPRAZOLE SODIUM 40 MILLIGRAM(S): 20 TABLET, DELAYED RELEASE ORAL at 05:51

## 2023-09-26 RX ADMIN — Medication 650 MILLIGRAM(S): at 23:26

## 2023-09-26 RX ADMIN — Medication 650 MILLIGRAM(S): at 06:35

## 2023-09-26 RX ADMIN — Medication 1 APPLICATION(S): at 05:45

## 2023-09-26 RX ADMIN — Medication 650 MILLIGRAM(S): at 13:39

## 2023-09-26 RX ADMIN — APIXABAN 10 MILLIGRAM(S): 2.5 TABLET, FILM COATED ORAL at 22:26

## 2023-09-26 RX ADMIN — CEFEPIME 1000 MILLIGRAM(S): 1 INJECTION, POWDER, FOR SOLUTION INTRAMUSCULAR; INTRAVENOUS at 10:58

## 2023-09-26 RX ADMIN — APIXABAN 10 MILLIGRAM(S): 2.5 TABLET, FILM COATED ORAL at 10:57

## 2023-09-26 RX ADMIN — POLYETHYLENE GLYCOL 3350 17 GRAM(S): 17 POWDER, FOR SOLUTION ORAL at 10:58

## 2023-09-26 RX ADMIN — Medication 50 MICROGRAM(S): at 05:52

## 2023-09-26 RX ADMIN — MIDODRINE HYDROCHLORIDE 5 MILLIGRAM(S): 2.5 TABLET ORAL at 22:27

## 2023-09-26 RX ADMIN — Medication 3 MILLIGRAM(S): at 23:15

## 2023-09-26 RX ADMIN — Medication 650 MILLIGRAM(S): at 05:51

## 2023-09-26 NOTE — PROGRESS NOTE ADULT - SUBJECTIVE AND OBJECTIVE BOX
[facility]: [facility]    Subjective: no complaints  9/25: sitting up in bed eating. no pain or discomfort. no back pain or discomfort. no leg swelling or discomfort. tolerating IV heparin and IV cefepime   9/27: laying in bed, depressed, tearful but overall no pain or discomfort. no fever or chills.     REVIEW OF SYSTEMS:    CONSTITUTIONAL: No weakness, fevers or chills  EYES/ENT: No visual changes;  No vertigo or throat pain   NECK: No pain or stiffness  RESPIRATORY: No cough, wheezing, hemoptysis; No shortness of breath  CARDIOVASCULAR: No chest pain or palpitations  GASTROINTESTINAL: No abdominal or epigastric pain. No nausea, vomiting, or hematemesis; No diarrhea or constipation. No melena or hematochezia.  GENITOURINARY: No dysuria, frequency or hematuria  NEUROLOGICAL: No numbness or weakness  SKIN: No itching, burning, rashes, or lesions   All other review of systems is negative unless indicated above      Vital Signs Last 24 Hrs  T(C): 37 (25 Sep 2023 08:42), Max: 37 (25 Sep 2023 08:42)  T(F): 98.6 (25 Sep 2023 08:42), Max: 98.6 (25 Sep 2023 08:42)  HR: 77 (25 Sep 2023 13:40) (76 - 109)  BP: 131/76 (25 Sep 2023 13:40) (111/68 - 158/87)  BP(mean): --  RR: 19 (25 Sep 2023 08:42) (18 - 19)  SpO2: 100% (25 Sep 2023 08:42) (99% - 100%)  room air          HEENT:   pupils equal and reactive, EOMI, no oropharyngeal lesions, erythema, exudates, oral thrush    NECK:   supple, no carotid bruits, no palpable lymph nodes, no thyromegaly    CV:  +S1, +S2, regular, no murmurs or rubs    RESP:   lungs clear to auscultation bilaterally, no wheezing, rales, rhonchi, good air entry bilaterally    BREAST:  not examined    GI:  abdomen soft, non-tender, non-distended, normal BS, no bruits, no abdominal masses, no palpable masses    RECTAL:  not examined    :  freitas cath draining clear yellow urine     MSK:   normal muscle tone, no atrophy, no rigidity, no contractions    EXT:   no clubbing, no cyanosis, no edema, no calf pain, swelling or erythema    VASCULAR:  pulses equal and symmetric in the upper and lower extremities    NEURO:  AAOX3, no focal neurological deficits, follows all commands, able to move extremities spontaneously    SKIN:  no ulcers, lesions or rashes        MEDICATIONS  (STANDING):  acetaminophen     Tablet .. 650 milliGRAM(s) Oral every 8 hours  aspirin enteric coated 81 milliGRAM(s) Oral daily  cefepime  Injectable. 1000 milliGRAM(s) IV Push every 12 hours  cholecalciferol 2000 Unit(s) Oral at bedtime  collagenase Ointment 1 Application(s) Topical three times a day  heparin  Infusion.  Unit(s)/Hr (12 mL/Hr) IV Continuous <Continuous>  levothyroxine 50 MICROGram(s) Oral daily  midodrine 5 milliGRAM(s) Oral every 8 hours  pantoprazole    Tablet 40 milliGRAM(s) Oral before breakfast  polyethylene glycol 3350 17 Gram(s) Oral daily  sertraline 50 milliGRAM(s) Oral daily  sodium chloride 0.9%. 1000 milliLiter(s) (100 mL/Hr) IV Continuous <Continuous>  thiamine 100 milliGRAM(s) Oral at bedtime  zinc sulfate 220 milliGRAM(s) Oral daily    MEDICATIONS  (PRN):  acetaminophen     Tablet .. 650 milliGRAM(s) Oral every 6 hours PRN Temp greater or equal to 38C (100.4F), Mild Pain (1 - 3)  aluminum hydroxide/magnesium hydroxide/simethicone Suspension 30 milliLiter(s) Oral every 4 hours PRN Dyspepsia  bisacodyl 5 milliGRAM(s) Oral every 12 hours PRN Constipation  heparin   Injectable 5500 Unit(s) IV Push every 6 hours PRN For aPTT less than 40  heparin   Injectable 2500 Unit(s) IV Push every 6 hours PRN For aPTT between 40 - 57  melatonin 3 milliGRAM(s) Oral at bedtime PRN Insomnia  ondansetron Injectable 4 milliGRAM(s) IV Push every 8 hours PRN Nausea and/or Vomiting      Urinalysis Basic - ( 24 Sep 2023 06:37 )    Color: x / Appearance: x / SG: x / pH: x  Gluc: 87 mg/dL / Ketone: x  / Bili: x / Urobili: x   Blood: x / Protein: x / Nitrite: x   Leuk Esterase: x / RBC: x / WBC x   Sq Epi: x / Non Sq Epi: x / Bacteria: x    24 Sep 2023 06:37    140    |  112    |  56     ----------------------------<  87     4.7     |  23     |  1.95     Ca    7.9        24 Sep 2023 06:37  Phos  2.1       24 Sep 2023 06:37  Mg     2.0       24 Sep 2023 06:37    TPro  5.1    /  Alb  1.6    /  TBili  0.2    /  DBili  x      /  AST  24     /  ALT  34     /  AlkPhos  83     24 Sep 2023 06:37  LIVER FUNCTIONS - ( 24 Sep 2023 06:37 )  Alb: 1.6 g/dL / Pro: 5.1 gm/dL / ALK PHOS: 83 U/L / ALT: 34 U/L / AST: 24 U/L / GGT: x         PTT - ( 24 Sep 2023 06:37 )  PTT:78.5 secCBC Full  -  ( 24 Sep 2023 06:37 )  WBC Count : 10.73 K/uL  Hemoglobin : 8.4 g/dL  Hematocrit : 26.0 %  Platelet Count - Automated : 412 K/uL  Mean Cell Volume : 93.9 fl  Mean Cell Hemoglobin : 30.3 pg  Mean Cell Hemoglobin Concentration : 32.3 gm/dL            PTT - ( 24 Sep 2023 06:37 )  PTT:78.5 sec              Assessment and Plan:   	    72 y/o/m with pmhx of  PMHx of CVA (with left-sided hemiparesis and hemiplegia), dysphagia, and facial weakness, hypotension, rhabdomyolysis, and metabolic encephalopathy , chronic Freitas  BIBEMS from [facility] to ED  on 9/21/23 with c/o worsening intermittent diffuse abdominal pain x 2 weeks. Pt reports [facility] and since then has mike having abdominal pain. Pt with LUE grace, on antibiotics for pseudomonas infection and bacteremia since 9/13. Reports fevers at home Tmax of 104F. Had diarrhea initially but it has since improved with meds given. Denies cough.   Patient recently has been receiving IV cefepime for pseudomonas bacterimia  Quality indicators: Skin breakdown stage 2 around the sacrum as per nursing /  (l) sided midline /  chronic Freitas ( changed in ED )       Sepsis POA due to PSAE probable left pyelonephritis   Urinary retention with left hydronephrosis   - recent history of bacteremia -  being treated for pseudomonas bacteremia  (MIDLINE L arm)  - this could be secondary to cystitis /  pyelo  - blood cx x2 neg, urine cx - neg ( pt was on IV abx)   - Started on IV antibiotics cefepime 1 gm q12h  x 14 days total  , on DC with midline  - close follow up with Dr. Wilson    Dyspnea , 2019 novel coronavirus disease (COVID-19).   elevated d-dimers due to Bilateral DVT   Trace pericardial effusion , small bilateral pleural effusions, left base atelectasis vs infiltrate  Moderate to severe MR    - no need for O2 support , - troponin neg   - no shortness of breath.  - CRP 40--> 34,  ferritin 2900--> 4200, d-dimers 620-- 830  - 2 d echo - EF wnl, mod-severe MR  - CXR - small b/l pleural effusion, left base infiltrate vs atelectasis   - doppler LE - + b/l DVT  - supportive care,  pulse O2   - heparin gtt dc'd --- switched to eliquis bid    - ID follows     h/o CVA (cerebrovascular accident).   - residual deficits  - supportive care.  - c/w ASA, not on statin  - check lipid profile in am - LDL 96     Hypotension.   -  continue with midodrine.    Acute on chronic renal failure.   - c/w IV fluids  - Creatinine Trend: 2.47<--, 2.25<--, 2.53<--  - monitor for improvement  - nephrology consult    Normocytic anemia  - suspected anemia of chronic disease /  no signs of bleeding.    Hypothyroidism   - TSH 10, free T4 low  - start levothyroxine 50 mcg   - o/p follow up    Vitamin D deficiency - replace    Constipation - miralax, dulcolax , monitor bm    Severe protein-calorie malnutrition.   - nutritional education provided   - supplements ordered   - thiamine 100 qd, Zinc, vit D ordered      Weakness - PT evaluation    Advance directives   - FULL code  - emergency contact sister  Marilu  [phone]  left  the message 9/21/23, 9/22    dc to home or [facility] if pt participating     Dispo -patient with severe debility, mostly bedbound , can not ambulate. Will need casemangement/socialwork, and Midline placement for IV abx 14 days total and

## 2023-09-27 LAB
ADD ON TEST-SPECIMEN IN LAB: SIGNIFICANT CHANGE UP
ANION GAP SERPL CALC-SCNC: 2 MMOL/L — LOW (ref 5–17)
BUN SERPL-MCNC: 63 MG/DL — HIGH (ref 7–23)
CALCIUM SERPL-MCNC: 8.8 MG/DL — SIGNIFICANT CHANGE UP (ref 8.5–10.1)
CHLORIDE SERPL-SCNC: 109 MMOL/L — HIGH (ref 96–108)
CO2 SERPL-SCNC: 24 MMOL/L — SIGNIFICANT CHANGE UP (ref 22–31)
CREAT SERPL-MCNC: 2.08 MG/DL — HIGH (ref 0.5–1.3)
EGFR: 33 ML/MIN/1.73M2 — LOW
GLUCOSE SERPL-MCNC: 91 MG/DL — SIGNIFICANT CHANGE UP (ref 70–99)
HCT VFR BLD CALC: 27 % — LOW (ref 39–50)
HGB BLD-MCNC: 8.5 G/DL — LOW (ref 13–17)
MCHC RBC-ENTMCNC: 29.6 PG — SIGNIFICANT CHANGE UP (ref 27–34)
MCHC RBC-ENTMCNC: 31.5 GM/DL — LOW (ref 32–36)
MCV RBC AUTO: 94.1 FL — SIGNIFICANT CHANGE UP (ref 80–100)
PLATELET # BLD AUTO: 345 K/UL — SIGNIFICANT CHANGE UP (ref 150–400)
POTASSIUM SERPL-MCNC: 5.7 MMOL/L — HIGH (ref 3.5–5.3)
POTASSIUM SERPL-SCNC: 5.7 MMOL/L — HIGH (ref 3.5–5.3)
RBC # BLD: 2.87 M/UL — LOW (ref 4.2–5.8)
RBC # FLD: 16.2 % — HIGH (ref 10.3–14.5)
SODIUM SERPL-SCNC: 135 MMOL/L — SIGNIFICANT CHANGE UP (ref 135–145)
WBC # BLD: 10.3 K/UL — SIGNIFICANT CHANGE UP (ref 3.8–10.5)
WBC # FLD AUTO: 10.3 K/UL — SIGNIFICANT CHANGE UP (ref 3.8–10.5)

## 2023-09-27 PROCEDURE — 99232 SBSQ HOSP IP/OBS MODERATE 35: CPT

## 2023-09-27 RX ORDER — SODIUM ZIRCONIUM CYCLOSILICATE 10 G/10G
10 POWDER, FOR SUSPENSION ORAL ONCE
Refills: 0 | Status: DISCONTINUED | OUTPATIENT
Start: 2023-09-27 | End: 2023-09-28

## 2023-09-27 RX ADMIN — ZINC SULFATE TAB 220 MG (50 MG ZINC EQUIVALENT) 220 MILLIGRAM(S): 220 (50 ZN) TAB at 09:11

## 2023-09-27 RX ADMIN — Medication 100 MILLIGRAM(S): at 21:13

## 2023-09-27 RX ADMIN — POLYETHYLENE GLYCOL 3350 17 GRAM(S): 17 POWDER, FOR SOLUTION ORAL at 09:11

## 2023-09-27 RX ADMIN — APIXABAN 10 MILLIGRAM(S): 2.5 TABLET, FILM COATED ORAL at 09:11

## 2023-09-27 RX ADMIN — Medication 5 MILLIGRAM(S): at 09:11

## 2023-09-27 RX ADMIN — Medication 650 MILLIGRAM(S): at 14:52

## 2023-09-27 RX ADMIN — Medication 1 APPLICATION(S): at 09:11

## 2023-09-27 RX ADMIN — MIDODRINE HYDROCHLORIDE 5 MILLIGRAM(S): 2.5 TABLET ORAL at 21:14

## 2023-09-27 RX ADMIN — SERTRALINE 50 MILLIGRAM(S): 25 TABLET, FILM COATED ORAL at 09:11

## 2023-09-27 RX ADMIN — PANTOPRAZOLE SODIUM 40 MILLIGRAM(S): 20 TABLET, DELAYED RELEASE ORAL at 06:18

## 2023-09-27 RX ADMIN — Medication 50 MICROGRAM(S): at 06:18

## 2023-09-27 RX ADMIN — APIXABAN 10 MILLIGRAM(S): 2.5 TABLET, FILM COATED ORAL at 21:14

## 2023-09-27 RX ADMIN — CEFEPIME 1000 MILLIGRAM(S): 1 INJECTION, POWDER, FOR SOLUTION INTRAMUSCULAR; INTRAVENOUS at 09:10

## 2023-09-27 RX ADMIN — Medication 2000 UNIT(S): at 21:14

## 2023-09-27 RX ADMIN — Medication 650 MILLIGRAM(S): at 21:15

## 2023-09-27 RX ADMIN — MIDODRINE HYDROCHLORIDE 5 MILLIGRAM(S): 2.5 TABLET ORAL at 06:18

## 2023-09-27 RX ADMIN — CEFEPIME 1000 MILLIGRAM(S): 1 INJECTION, POWDER, FOR SOLUTION INTRAMUSCULAR; INTRAVENOUS at 21:14

## 2023-09-27 RX ADMIN — MIDODRINE HYDROCHLORIDE 5 MILLIGRAM(S): 2.5 TABLET ORAL at 14:52

## 2023-09-27 RX ADMIN — Medication 81 MILLIGRAM(S): at 09:11

## 2023-09-27 RX ADMIN — Medication 650 MILLIGRAM(S): at 06:19

## 2023-09-27 NOTE — PROGRESS NOTE ADULT - SUBJECTIVE AND OBJECTIVE BOX
Patient is a 73y Male who reports no complaints as new per staff     MEDICATIONS  (STANDING):  acetaminophen     Tablet .. 650 milliGRAM(s) Oral every 8 hours  apixaban 10 milliGRAM(s) Oral every 12 hours  aspirin enteric coated 81 milliGRAM(s) Oral daily  cefepime  Injectable. 1000 milliGRAM(s) IV Push every 12 hours  cholecalciferol 2000 Unit(s) Oral at bedtime  collagenase Ointment 1 Application(s) Topical daily  levothyroxine 50 MICROGram(s) Oral daily  midodrine 5 milliGRAM(s) Oral every 8 hours  pantoprazole    Tablet 40 milliGRAM(s) Oral before breakfast  polyethylene glycol 3350 17 Gram(s) Oral daily  sertraline 50 milliGRAM(s) Oral daily  sodium zirconium cyclosilicate 10 Gram(s) Oral once  thiamine 100 milliGRAM(s) Oral at bedtime  zinc sulfate 220 milliGRAM(s) Oral daily    MEDICATIONS  (PRN):  acetaminophen     Tablet .. 650 milliGRAM(s) Oral every 6 hours PRN Temp greater or equal to 38C (100.4F), Mild Pain (1 - 3)  aluminum hydroxide/magnesium hydroxide/simethicone Suspension 30 milliLiter(s) Oral every 4 hours PRN Dyspepsia  bisacodyl 5 milliGRAM(s) Oral every 12 hours PRN Constipation  melatonin 3 milliGRAM(s) Oral at bedtime PRN Insomnia  ondansetron Injectable 4 milliGRAM(s) IV Push every 8 hours PRN Nausea and/or Vomiting        T(C): , Max: 37.2 (09-27-23 @ 09:11)  T(F): , Max: 98.9 (09-27-23 @ 09:11)  HR: 87 (09-27-23 @ 09:11)  BP: 141/87 (09-27-23 @ 09:11)  BP(mean): 129 (09-26-23 @ 17:17)  RR: 17 (09-27-23 @ 06:17)  SpO2: 100% (09-27-23 @ 09:11)  Wt(kg): --    09-26 @ 07:01  -  09-27 @ 07:00  --------------------------------------------------------  IN: 0 mL / OUT: 3650 mL / NET: -3650 mL          PHYSICAL EXAM:    Constitutional: frail appearing  HEENT: MM  Neck: No LAD, No JVD  Respiratory: dist  Cardiovascular: S1 and S2,   Extremities: No peripheral edema  Neurological: A/O x 3,            LABS:                        8.5    10.30 )-----------( 345      ( 27 Sep 2023 08:02 )             27.0     27 Sep 2023 08:02    135    |  109    |  63     ----------------------------<  91     5.7     |  24     |  2.08   26 Sep 2023 07:45    137    |  109    |  64     ----------------------------<  92     5.7     |  25     |  2.04   25 Sep 2023 07:01    140    |  112    |  62     ----------------------------<  95     5.6     |  25     |  1.91   24 Sep 2023 06:37    140    |  112    |  56     ----------------------------<  87     4.7     |  23     |  1.95     Ca    8.8        27 Sep 2023 08:02  Ca    8.6        26 Sep 2023 07:45  Ca    8.3        25 Sep 2023 07:01  Ca    7.9        24 Sep 2023 06:37  Phos  2.1       24 Sep 2023 06:37  Mg     2.0       24 Sep 2023 06:37    TPro  5.8    /  Alb  1.9    /  TBili  0.3    /  DBili  x      /  AST  36     /  ALT  55     /  AlkPhos  89     26 Sep 2023 07:45  TPro  5.1    /  Alb  1.6    /  TBili  0.2    /  DBili  x      /  AST  24     /  ALT  34     /  AlkPhos  83     24 Sep 2023 06:37          Urine Studies:  Urinalysis Basic - ( 27 Sep 2023 08:02 )    Color: x / Appearance: x / SG: x / pH: x  Gluc: 91 mg/dL / Ketone: x  / Bili: x / Urobili: x   Blood: x / Protein: x / Nitrite: x   Leuk Esterase: x / RBC: x / WBC x   Sq Epi: x / Non Sq Epi: x / Bacteria: x            RADIOLOGY & ADDITIONAL STUDIES:

## 2023-09-27 NOTE — ADVANCED PRACTICE NURSE CONSULT - ASSESSMENT
Patient is awake and alert. Midline insertion along with risks, benefits, possible complications and infection prevention explained to pt who verbalized understanding. All questions addressed and verbal consent to place Midline obtained. Time out along with hand washing by RN done. Right arm cleansed with CHG. Using sterile technique, under ultrasound guidance, placed Power Glide Pro 20g/10cm (lot#[id number]) into right basilic vein. Brisk blood return and flushed with 20ml NS. Minimal blood loss and pt tolerated procedure well. All sharps accounted for. Dressing and end cap in place. Report given to district RN.

## 2023-09-27 NOTE — CONSULT NOTE ADULT - ASSESSMENT
Assessment: 73y year old Male seen by podiatry team for the evaluation of;   1. 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 Pain from elongated nails, thickened and dystrophic nails, with difficulty to ambulate      Plan:  - Patient was seen and examined by podiatry team  - Discussed the case with Dr. Darling  - Discussed all the potential treatment plans with the patient  - Educated Pt about the proper foot hygiene, to prevent the recurrence of the fungal foot infection when cleared.  - Educated Pt about importance of daily foot examinations and proper shoe gear.  - Pt demonstrated verbal understanding  - Aseptic mechanical debridement and curettage of toe nails left foot 2, 3, and 5th; right 3rd using sterile nail nippers,  - Pt tolerated the procedure well, without any complication.  - Thank you for the courtesy of this consult, podiatry will sign off at this time.  - Please re-consult as needed.

## 2023-09-27 NOTE — PROGRESS NOTE ADULT - PROVIDER SPECIALTY LIST ADULT
Hospitalist
Infectious Disease
Hospitalist
Nephrology
Hospitalist
Nephrology
Nephrology
Hospitalist
Hospitalist

## 2023-09-27 NOTE — PROGRESS NOTE ADULT - ASSESSMENT
73 with poor medical compliance (never saw a Dr until recent CVA) CVA (with left-sided hemiparesis and hemiplegia), dysphagia, and facial weakness, hypotension, rhabdomyolysis, and metabolic encephalopathy with history of HIRA recently (nearly requiring HD) with renal follow up of HIRA.     HIRA  -Renal function appears to be stabilizing from last admit at [facility], new baseline?  -  optimize intake  -NO nsaids/contrast  -stopped 0ral bicarb    Hyperkalemia  -Spec in lab bmp ordered  -Treat medically w lokelma if rmains high  -Low k diet, ensure optimal bm    D/c with RN staff, GUSTAVO Ennis

## 2023-09-27 NOTE — CONSULT NOTE ADULT - SUBJECTIVE AND OBJECTIVE BOX
Date of service: 9/27/2023  CC: Elongated toe nails  HPI:   73y year old Male seen at bedside with a chief complaint of painful thickened, elongated and dystrophic toenails digits 2, 3, 5 left and 3 right. Patient is medically managed  by Medicine/Hospitalists. Patient reports he is experiencing pain walking and in shoe gear. Patient denies any history of trauma to both feet. Patient has no other pedal complaints at this time.      Review of systems:  REVIEW OF SYSTEMS:     EYES: No eye pain, visual disturbances, or discharge     ENT:  No difficulty hearing; No throat pain     RESPIRATORY: No cough, wheezing, chills or hemoptysis; No shortness of breath     CARDIOVASCULAR: No chest pain, palpitations     GASTROINTESTINAL: No abdominal or epigastric pain. No nausea, vomiting, or hematemesis; No diarrhea or constipation.     NEUROLOGICAL: No headaches, loss of strength, numbness, or tremors     SKIN: No itching, burning, rashes     MUSCULOSKELETAL: No joint pain or swelling; No muscle, back, or extremity pain       PMH: CVA (cerebrovascular accident)    Hypotension    Metabolic encephalopathy    Rhabdomyolysis    GERD (gastroesophageal reflux disease)      PSH:    Allergies:No Known Allergies      Labs:                        8.5    10.30 )-----------( 345      ( 27 Sep 2023 08:02 )             27.0       WBC Trend  10.30 Date (09-27 @ 08:02)  9.45 Date (09-26 @ 07:45)  10.98<H> Date (09-25 @ 07:01)  10.73<H> Date (09-24 @ 06:37)  13.11<H> Date (09-23 @ 06:49)  12.81<H> Date (09-22 @ 05:51)  14.29<H> Date (09-21 @ 10:11)  14.56<H> Date (09-20 @ 18:40)      Chem  09-27    135  |  109<H>  |  63<H>  ----------------------------<  91  5.7<H>   |  24  |  2.08<H>    Ca    8.8      27 Sep 2023 08:02    TPro  5.8<L>  /  Alb  1.9<L>  /  TBili  0.3  /  DBili  x   /  AST  36  /  ALT  55  /  AlkPhos  89  09-26        T(F): 98.9 (09-27-23 @ 09:11), Max: 98.9 (09-27-23 @ 09:11)  HR: 87 (09-27-23 @ 09:11) (85 - 103)  BP: 141/87 (09-27-23 @ 09:11) (110/65 - 141/87)  RR: 17 (09-27-23 @ 06:17) (17 - 18)  SpO2: 100% (09-27-23 @ 09:11) (96% - 100%)  Wt(kg): --    Physical Examination:  Constitutional: Alert, oriented x3, in NAD.  Focused LE exam:  Vascular: Temperature gradient is warm to warm b/l. Palpable pulses. Capillary re-fill time less than 3 seconds digits 1-5 bilateral.  Derm:  No open lesions or inter-digital macerations, and no clinical signs of acute infection noted bilaterally.   Toenails 1-5 Right and Left feet thickened, discolored, and dystrophic with subungual debris. Onychogryphosis of Toe nail 2, 3, and 5 Left foot and 3 right foot with tenderness upon palpation of those fungal nails.   Neuro: Intact protective sensation to the level of the digits 1-5 bilaterally.  MSK: Manual muscle strength testing  5/5 all major muscle groups foot/ankle bilaterally. ROM of all foot/ankle joints is WNL bilaterally. No structural abnormality, bilaterally

## 2023-09-27 NOTE — PROGRESS NOTE ADULT - SUBJECTIVE AND OBJECTIVE BOX
[facility]/ severe debility    Subjective: no complaints  9/25: sitting up in bed eating. no pain or discomfort. no back pain or discomfort. no leg swelling or discomfort. tolerating IV heparin and IV cefepime   9/26: laying in bed, depressed, tearful but overall no pain or discomfort. no fever or chills.   9/27: no pain or discomfort verbalized. agreeable to rehab upon dc. tolerating meds. awaiting midline     REVIEW OF SYSTEMS:    CONSTITUTIONAL: No weakness, fevers or chills  EYES/ENT: No visual changes;  No vertigo or throat pain   NECK: No pain or stiffness  RESPIRATORY: No cough, wheezing, hemoptysis; No shortness of breath  CARDIOVASCULAR: No chest pain or palpitations  GASTROINTESTINAL: No abdominal or epigastric pain. No nausea, vomiting, or hematemesis; No diarrhea or constipation. No melena or hematochezia.  GENITOURINARY: No dysuria, frequency or hematuria  NEUROLOGICAL: No numbness or weakness  SKIN: No itching, burning, rashes, or lesions   All other review of systems is negative unless indicated above      Vital Signs Last 24 Hrs  T(C): 37.2 (27 Sep 2023 09:11), Max: 37.2 (27 Sep 2023 09:11)  T(F): 98.9 (27 Sep 2023 09:11), Max: 98.9 (27 Sep 2023 09:11)  HR: 87 (27 Sep 2023 12:46) (85 - 103)  BP: 115/78 (27 Sep 2023 12:46) (110/65 - 141/87)  BP(mean): 129 (26 Sep 2023 17:17) (129 - 129)  RR: 17 (27 Sep 2023 06:17) (17 - 18)  SpO2: 100% (27 Sep 2023 09:11) (96% - 100%)    Parameters below as of 27 Sep 2023 09:11  Patient On (Oxygen Delivery Method): room air            HEENT:   pupils equal and reactive, EOMI, no oropharyngeal lesions, erythema, exudates, oral thrush    NECK:   supple, no carotid bruits, no palpable lymph nodes, no thyromegaly    CV:  +S1, +S2, regular, no murmurs or rubs    RESP:   lungs clear to auscultation bilaterally, no wheezing, rales, rhonchi, good air entry bilaterally    BREAST:  not examined    GI:  abdomen soft, non-tender, non-distended, normal BS, no bruits, no abdominal masses, no palpable masses    RECTAL:  not examined    :  freitas cath draining clear yellow urine     MSK:   normal muscle tone, no atrophy, no rigidity, no contractions    EXT:   no clubbing, no cyanosis, no edema, no calf pain, swelling or erythema    VASCULAR:  pulses equal and symmetric in the upper and lower extremities    NEURO:  AAOX3, no focal neurological deficits, follows all commands, able to move extremities spontaneously    SKIN:  no ulcers, lesions or rashes        MEDICATIONS  (STANDING):  acetaminophen     Tablet .. 650 milliGRAM(s) Oral every 8 hours  apixaban 10 milliGRAM(s) Oral every 12 hours  aspirin enteric coated 81 milliGRAM(s) Oral daily  cefepime  Injectable. 1000 milliGRAM(s) IV Push every 12 hours  cholecalciferol 2000 Unit(s) Oral at bedtime  collagenase Ointment 1 Application(s) Topical daily  levothyroxine 50 MICROGram(s) Oral daily  midodrine 5 milliGRAM(s) Oral every 8 hours  pantoprazole    Tablet 40 milliGRAM(s) Oral before breakfast  polyethylene glycol 3350 17 Gram(s) Oral daily  sertraline 50 milliGRAM(s) Oral daily  sodium zirconium cyclosilicate 10 Gram(s) Oral once  thiamine 100 milliGRAM(s) Oral at bedtime  zinc sulfate 220 milliGRAM(s) Oral daily    MEDICATIONS  (PRN):  acetaminophen     Tablet .. 650 milliGRAM(s) Oral every 6 hours PRN Temp greater or equal to 38C (100.4F), Mild Pain (1 - 3)  aluminum hydroxide/magnesium hydroxide/simethicone Suspension 30 milliLiter(s) Oral every 4 hours PRN Dyspepsia  bisacodyl 5 milliGRAM(s) Oral every 12 hours PRN Constipation  melatonin 3 milliGRAM(s) Oral at bedtime PRN Insomnia  ondansetron Injectable 4 milliGRAM(s) IV Push every 8 hours PRN Nausea and/or Vomiting                                8.5    10.30 )-----------( 345      ( 27 Sep 2023 08:02 )             27.0     09-27    135  |  109<H>  |  63<H>  ----------------------------<  91  5.7<H>   |  24  |  2.08<H>    Ca    8.8      27 Sep 2023 08:02    TPro  5.8<L>  /  Alb  1.9<L>  /  TBili  0.3  /  DBili  x   /  AST  36  /  ALT  55  /  AlkPhos  89  09-26        LIVER FUNCTIONS - ( 26 Sep 2023 07:45 )  Alb: 1.9 g/dL / Pro: 5.8 gm/dL / ALK PHOS: 89 U/L / ALT: 55 U/L / AST: 36 U/L / GGT: x           PTT - ( 26 Sep 2023 07:45 )  PTT:36.4 sec  Urinalysis Basic - ( 27 Sep 2023 08:02 )    Color: x / Appearance: x / SG: x / pH: x  Gluc: 91 mg/dL / Ketone: x  / Bili: x / Urobili: x   Blood: x / Protein: x / Nitrite: x   Leuk Esterase: x / RBC: x / WBC x   Sq Epi: x / Non Sq Epi: x / Bacteria: x          Assessment and Plan:   	    74 y/o/m with pmhx of  PMHx of CVA (with left-sided hemiparesis and hemiplegia), dysphagia, and facial weakness, hypotension, rhabdomyolysis, and metabolic encephalopathy , chronic Freitas  BIBEMS from [facility] to ED  on 9/21/23 with c/o worsening intermittent diffuse abdominal pain x 2 weeks. Pt [facility] and since then has mike having abdominal pain. Pt with LUE midline, on antibiotics for pseudomonas infection and bacteremia since 9/13. Reports fevers at home Tmax of 104F. Had diarrhea initially but it has since improved with meds given. Denies cough.   Patient recently has been receiving IV cefepime for pseudomonas bacterimia  Quality indicators: Skin breakdown stage 2 around the sacrum as per nursing /  (l) sided midline /  chronic Freitas ( changed in ED )       Sepsis POA due to PSAE probable left pyelonephritis   Urinary retention with left hydronephrosis   - recent history of bacteremia -  being treated for pseudomonas bacteremia  (MIDLINE L arm)  - this could be secondary to cystitis /  pyelo  - blood cx x2 neg, urine cx - neg ( pt was on IV abx)   - Started on IV antibiotics cefepime 1 gm q12h  x 14 days total  , on DC with midline for abx until 10/3       Dyspnea , 2019 novel coronavirus disease (COVID-19).  --- dyspnea resolved   elevated d-dimers due to Bilateral DVT   Trace pericardial effusion , small bilateral pleural effusions, left base atelectasis vs infiltrate  Moderate to severe MR    - no need for O2 support , - troponin neg   - no shortness of breath.  - CRP 40--> 34,  ferritin 2900--> 4200, d-dimers 620-- 830  - 2 d echo - EF wnl, mod-severe MR  - CXR - small b/l pleural effusion, left base infiltrate vs atelectasis   - doppler LE - + b/l DVT  - supportive care,  pulse O2   - heparin gtt dc'd --- switched to eliquis bid  - tolerating   - ID follows     h/o CVA (cerebrovascular accident).   - residual deficits  - supportive care.  - c/w ASA, not on statin  - check lipid profile in am - LDL 96     Hypotension.   -  continue with midodrine.    hyperkalemia   - 5.7 today - start on [facility]     Acute on chronic renal failure.   - c/w IV fluids  - Creatinine Trend: 2.47<--, 2.25<--, 2.53<--2.08  - monitor for improvement  - nephrology consult    Normocytic anemia  - suspected anemia of chronic disease /  no signs of bleeding.    Hypothyroidism   - TSH 10, free T4 low  - start levothyroxine 50 mcg   - o/p follow up    Vitamin D deficiency - replace    Constipation - miralax, dulcolax , monitor bm    Severe protein-calorie malnutrition.   - nutritional education provided   - supplements ordered   - thiamine 100 qd, Zinc, vit D ordered      Weakness - PT evaluation    Advance directives   - FULL code    dc to home or [facility] tomorrow if potassium improved with midline

## 2023-09-27 NOTE — PROGRESS NOTE ADULT - NUTRITIONAL ASSESSMENT
This patient has been assessed with a concern for Malnutrition and has been determined to have a diagnosis/diagnoses of Severe protein-calorie malnutrition.    This patient is being managed with:   Diet Regular-  Liquid Protein Supplement     Qty per Day:  3  Supplement Feeding Modality:  Oral  Ensure Plant-Based Cans or Servings Per Day:  2       Frequency:  Daily  Entered: Sep 22 2023  8:33AM  

## 2023-09-28 ENCOUNTER — TRANSCRIPTION ENCOUNTER (OUTPATIENT)
Age: 73
End: 2023-09-28

## 2023-09-28 VITALS
OXYGEN SATURATION: 100 % | DIASTOLIC BLOOD PRESSURE: 74 MMHG | SYSTOLIC BLOOD PRESSURE: 143 MMHG | HEART RATE: 92 BPM | TEMPERATURE: 98 F

## 2023-09-28 LAB
ANION GAP SERPL CALC-SCNC: 6 MMOL/L — SIGNIFICANT CHANGE UP (ref 5–17)
BUN SERPL-MCNC: 61 MG/DL — HIGH (ref 7–23)
CALCIUM SERPL-MCNC: 8.9 MG/DL — SIGNIFICANT CHANGE UP (ref 8.5–10.1)
CHLORIDE SERPL-SCNC: 110 MMOL/L — HIGH (ref 96–108)
CO2 SERPL-SCNC: 21 MMOL/L — LOW (ref 22–31)
CREAT SERPL-MCNC: 2.1 MG/DL — HIGH (ref 0.5–1.3)
EGFR: 33 ML/MIN/1.73M2 — LOW
GLUCOSE SERPL-MCNC: 95 MG/DL — SIGNIFICANT CHANGE UP (ref 70–99)
HCT VFR BLD CALC: 26.6 % — LOW (ref 39–50)
HGB BLD-MCNC: 8.5 G/DL — LOW (ref 13–17)
MCHC RBC-ENTMCNC: 29.7 PG — SIGNIFICANT CHANGE UP (ref 27–34)
MCHC RBC-ENTMCNC: 32 GM/DL — SIGNIFICANT CHANGE UP (ref 32–36)
MCV RBC AUTO: 93 FL — SIGNIFICANT CHANGE UP (ref 80–100)
PLATELET # BLD AUTO: 327 K/UL — SIGNIFICANT CHANGE UP (ref 150–400)
POTASSIUM SERPL-MCNC: 5.2 MMOL/L — SIGNIFICANT CHANGE UP (ref 3.5–5.3)
POTASSIUM SERPL-SCNC: 5.2 MMOL/L — SIGNIFICANT CHANGE UP (ref 3.5–5.3)
RBC # BLD: 2.86 M/UL — LOW (ref 4.2–5.8)
RBC # FLD: 16.3 % — HIGH (ref 10.3–14.5)
SODIUM SERPL-SCNC: 137 MMOL/L — SIGNIFICANT CHANGE UP (ref 135–145)
WBC # BLD: 10.35 K/UL — SIGNIFICANT CHANGE UP (ref 3.8–10.5)
WBC # FLD AUTO: 10.35 K/UL — SIGNIFICANT CHANGE UP (ref 3.8–10.5)

## 2023-09-28 PROCEDURE — 99239 HOSP IP/OBS DSCHRG MGMT >30: CPT

## 2023-09-28 RX ORDER — APIXABAN 2.5 MG/1
2 TABLET, FILM COATED ORAL
Qty: 0 | Refills: 0 | DISCHARGE
Start: 2023-09-28

## 2023-09-28 RX ORDER — POLYETHYLENE GLYCOL 3350 17 G/17G
17 POWDER, FOR SOLUTION ORAL
Qty: 0 | Refills: 0 | DISCHARGE
Start: 2023-09-28

## 2023-09-28 RX ORDER — CHOLECALCIFEROL (VITAMIN D3) 125 MCG
2000 CAPSULE ORAL
Qty: 0 | Refills: 0 | DISCHARGE
Start: 2023-09-28

## 2023-09-28 RX ORDER — LEVOTHYROXINE SODIUM 125 MCG
1 TABLET ORAL
Qty: 0 | Refills: 0 | DISCHARGE
Start: 2023-09-28

## 2023-09-28 RX ORDER — THIAMINE MONONITRATE (VIT B1) 100 MG
1 TABLET ORAL
Qty: 0 | Refills: 0 | DISCHARGE
Start: 2023-09-28

## 2023-09-28 RX ORDER — OXYCODONE HYDROCHLORIDE 5 MG/1
10 TABLET ORAL ONCE
Refills: 0 | Status: DISCONTINUED | OUTPATIENT
Start: 2023-09-28 | End: 2023-09-28

## 2023-09-28 RX ADMIN — ZINC SULFATE TAB 220 MG (50 MG ZINC EQUIVALENT) 220 MILLIGRAM(S): 220 (50 ZN) TAB at 09:38

## 2023-09-28 RX ADMIN — Medication 50 MICROGRAM(S): at 05:06

## 2023-09-28 RX ADMIN — CEFEPIME 1000 MILLIGRAM(S): 1 INJECTION, POWDER, FOR SOLUTION INTRAMUSCULAR; INTRAVENOUS at 09:39

## 2023-09-28 RX ADMIN — SERTRALINE 50 MILLIGRAM(S): 25 TABLET, FILM COATED ORAL at 09:38

## 2023-09-28 RX ADMIN — Medication 650 MILLIGRAM(S): at 05:06

## 2023-09-28 RX ADMIN — APIXABAN 10 MILLIGRAM(S): 2.5 TABLET, FILM COATED ORAL at 09:38

## 2023-09-28 RX ADMIN — PANTOPRAZOLE SODIUM 40 MILLIGRAM(S): 20 TABLET, DELAYED RELEASE ORAL at 05:06

## 2023-09-28 RX ADMIN — POLYETHYLENE GLYCOL 3350 17 GRAM(S): 17 POWDER, FOR SOLUTION ORAL at 09:38

## 2023-09-28 RX ADMIN — MIDODRINE HYDROCHLORIDE 5 MILLIGRAM(S): 2.5 TABLET ORAL at 05:07

## 2023-09-28 RX ADMIN — Medication 81 MILLIGRAM(S): at 09:39

## 2023-09-28 RX ADMIN — Medication 1 APPLICATION(S): at 09:39

## 2023-09-28 NOTE — DISCHARGE NOTE PROVIDER - NSDCMRMEDTOKEN_GEN_ALL_CORE_FT
apixaban 5 mg oral tablet: 2 tab(s) orally every 12 hours 10 mg twice a day until 10/2/23 and then 5mg twice a day for 30 days  aspirin 81 mg oral delayed release tablet: 1 tab(s) orally once a day  bisacodyl 5 mg oral delayed release tablet: 1 tab(s) orally every 12 hours As needed Constipation  cefepime 2 g injection: 2 gram(s) intravenously once a day for 14 days  cholecalciferol oral tablet: 2000 unit(s) orally once a day (at bedtime)  collagenase 250 units/g topical ointment: Apply topically to affected area once a day and as needed, apply to left buttock  levothyroxine 50 mcg (0.05 mg) oral tablet: 1 tab(s) orally once a day  magnesium oxide 400 mg oral tablet: 1 tab(s) orally 2 times a day  midodrine 5 mg oral tablet: 1 tab(s) orally every 8 hours , HOLD for SBP&gt;130  pantoprazole 40 mg oral delayed release tablet: 1 tab(s) orally once a day  polyethylene glycol 3350 oral powder for reconstitution: 17 gram(s) orally once a day  Santyl 250 units/g topical ointment: Apply topically to affected area 3 times a day with each shift change and as needed.  for Coccyx area  sertraline 50 mg oral tablet: 1 tab(s) orally once a day  sodium bicarbonate 650 mg oral tablet: 2 tab(s) orally 2 times a day  thiamine 100 mg oral tablet: 1 tab(s) orally once a day (at bedtime)  Tylenol 325 mg oral tablet: 2 tab(s) orally every 6 hours as needed for pain

## 2023-09-28 NOTE — DISCHARGE NOTE PROVIDER - HOSPITAL COURSE
Assessment and Plan:   	    72 y/o/m with pmhx of  PMHx of CVA (with left-sided hemiparesis and hemiplegia), dysphagia, and facial weakness, hypotension, rhabdomyolysis, and metabolic encephalopathy , chronic Conklin  BIBEMS from [facility] to ED  on 9/21/23 with c/o worsening intermittent diffuse abdominal pain x 2 weeks. Pt reports he was discharged from hospital 2 weeks to [facility] rehab and since then has mike having abdominal pain. Pt with LUE midline, on antibiotics for pseudomonas infection and bacteremia since 9/13. Reports fevers at home Tmax of 104F. Had diarrhea initially but it has since improved with meds given. Denies cough.   Patient recently has been receiving IV cefepime for pseudomonas bacterimia  Quality indicators: Skin breakdown stage 2 around the sacrum as per nursing /  (l) sided midline /  chronic Conklin ( changed in ED )         pt admitted for acute sepsis 2/2 to Pseudomonas and pyelonephritis - was evlaed by ID - placed on Cefepime - will be dc on IV cefepime via midline until 10/3. pt course was complicated by acute covid infection, with secondary complication of bilateral DVT which he was placed on IV heparin then switched to PO Eliquis - will be on Eliquis for 4 more days to complete an initial of 7 days and will be on Eliquis 5mg BID for 30 days.     Sepsis POA due to PSAE probable left pyelonephritis   Urinary retention with left hydronephrosis   - recent history of bacteremia -  being treated for pseudomonas bacteremia  (MIDLINE L arm)  - this could be secondary to cystitis /  pyelo  - blood cx x2 neg, urine cx - neg ( pt was on IV abx)   - Started on IV antibiotics cefepime 1 gm q12h  x 14 days total  , on DC with midline for abx until 10/3       Dyspnea , 2019 novel coronavirus disease (COVID-19).  --- dyspnea resolved   elevated d-dimers due to Bilateral DVT   Trace pericardial effusion , small bilateral pleural effusions, left base atelectasis vs infiltrate  Moderate to severe MR    - no need for O2 support , - troponin neg   - no shortness of breath.  - CRP 40--> 34,  ferritin 2900--> 4200, d-dimers 620-- 830  - 2 d echo - EF wnl, mod-severe MR  - CXR - small b/l pleural effusion, left base infiltrate vs atelectasis   - doppler LE - + b/l DVT  - supportive care,  pulse O2   - heparin gtt dc'd --- switched to eliquis bid  - tolerating     h/o CVA (cerebrovascular accident).   - residual deficits  - supportive care.  - c/w ASA, not on statin  - check lipid profile in am - LDL 96     Hypotension.   -  continue with midodrine.    hyperkalemia   - 5.7 down to 5.2 after one dose of PO Lokelma     Acute on chronic renal failure.   - c/w IV fluids  - Creatinine Trend: 2.47<--, 2.25<--, 2.53<--2.08  - monitor for improvement  - nephrology consult    Normocytic anemia  - suspected anemia of chronic disease /  no signs of bleeding.    Hypothyroidism   - TSH 10, free T4 low  - start levothyroxine 50 mcg   - o/p follow up    Vitamin D deficiency - replace    Constipation - miralax, dulcolax , monitor bm    Severe protein-calorie malnutrition.   - nutritional education provided   - supplements ordered   - thiamine 100 qd, Zinc, vit D ordered      Weakness - PT evaluation    Advance directives   - FULL code    dc to [facility] today - with midline abx until 10/3    spent ___55_ mins preparing dc.   above plan discussed with pt, bedside RN and MD Mehta    Assessment and Plan:   	    74 y/o/m with pmhx of  PMHx of CVA (with left-sided hemiparesis and hemiplegia), dysphagia, and facial weakness, hypotension, rhabdomyolysis, and metabolic encephalopathy , chronic Conklin  BIBEMS from [facility] to ED  on 9/21/23 with c/o worsening intermittent diffuse abdominal pain x 2 weeks. Pt reports he was discharged from hospital 2 weeks to [facility] rehab and since then has mike having abdominal pain. Pt with LUE midline, on antibiotics for pseudomonas infection and bacteremia since 9/13. Reports fevers at home Tmax of 104F. Had diarrhea initially but it has since improved with meds given. Denies cough.   Patient recently has been receiving IV cefepime for pseudomonas bacterimia  Quality indicators: Skin breakdown stage 2 around the sacrum as per nursing /  (l) sided midline /  chronic Conklin ( changed in ED )         pt admitted for acute sepsis 2/2 to Pseudomonas and pyelonephritis - was evlaed by ID - placed on Cefepime - will be dc on IV cefepime via midline until 10/3. pt course was complicated by acute covid infection, with secondary complication of bilateral DVT which he was placed on IV heparin then switched to PO Eliquis - will be on Eliquis for 4 more days to complete an initial of 7 days and will be on Eliquis 5mg BID for 30 days.     Sepsis POA due to PSAE probable left pyelonephritis   Urinary retention with left hydronephrosis   - recent history of bacteremia -  being treated for pseudomonas bacteremia  (MIDLINE L arm)  - this could be secondary to cystitis /  pyelo  - blood cx x2 neg, urine cx - neg ( pt was on IV abx)   - Started on IV antibiotics cefepime 1 gm q12h  x 14 days total  , on DC with midline for abx until 10/3       Dyspnea , 2019 novel coronavirus disease (COVID-19).  --- dyspnea resolved   elevated d-dimers due to Bilateral DVT   Trace pericardial effusion , small bilateral pleural effusions, left base atelectasis vs infiltrate  Moderate to severe MR    - no need for O2 support , - troponin neg   - no shortness of breath.  - CRP 40--> 34,  ferritin 2900--> 4200, d-dimers 620-- 830  - 2 d echo - EF wnl, mod-severe MR  - CXR - small b/l pleural effusion, left base infiltrate vs atelectasis   - doppler LE - + b/l DVT  - supportive care,  pulse O2   - heparin gtt dc'd --- switched to eliquis bid  - tolerating     h/o CVA (cerebrovascular accident).   - residual deficits  - supportive care.  - c/w ASA, not on statin  - check lipid profile in am - LDL 96     Hypotension.   -  continue with midodrine.    hyperkalemia   - 5.7 down to 5.2 after one dose of PO Lokelma     Acute on chronic renal failure.   - c/w IV fluids  - Creatinine Trend: 2.47<--, 2.25<--, 2.53<--2.08  - monitor for improvement  - nephrology consult    Normocytic anemia  - suspected anemia of chronic disease /  no signs of bleeding.    Hypothyroidism   - TSH 10, free T4 low  - start levothyroxine 50 mcg   - o/p follow up    Vitamin D deficiency - replace    Constipation - miralax, dulcolax , monitor bm    Severe protein-calorie malnutrition.   - nutritional education provided   - supplements ordered   - thiamine 100 qd, Zinc, vit D ordered      Weakness - PT evaluation    Advance directives   - FULL code    dc to [facility] today - with midline abx until 10/3    spent ___55_ mins preparing dc.   above plan discussed with pt, bedside RN and MD Mehta     Attending Attestation:  I agree with the assessment and plan of GUSTAVO Ennis as stated and discussed.

## 2023-09-28 NOTE — DISCHARGE NOTE PROVIDER - NSDCPNSUBOBJ_GEN_ALL_CORE
All 10 systems reviewed and found to be negative with the exception of what has been described above.    Vital Signs Last 24 Hrs  T(C): 36.9 (28 Sep 2023 08:15), Max: 37.1 (27 Sep 2023 22:50)  T(F): 98.5 (28 Sep 2023 08:15), Max: 98.7 (27 Sep 2023 22:50)  HR: 92 (28 Sep 2023 08:15) (87 - 106)  BP: 143/74 (28 Sep 2023 08:15) (115/78 - 143/74)  BP(mean): --  RR: 18 (27 Sep 2023 22:50) (18 - 19)  SpO2: 100% (28 Sep 2023 08:15) (100% - 100%): room air      HEENT:   pupils equal and reactive, EOMI, no oropharyngeal lesions, erythema, exudates, oral thrush  NECK:   supple, no carotid bruits, no palpable lymph nodes, no thyromegaly  CV:  +S1, +S2, regular, no murmurs or rubs  RESP:   lungs clear to auscultation bilaterally, no wheezing, rales, rhonchi, good air entry bilaterally  GI:  abdomen soft, non-tender, non-distended, normal BS, no bruits, no abdominal masses, no palpable masses  :  freitas cath draining clear yellow urine   MSK:   normal muscle tone, no atrophy, no rigidity, no contractions  EXT:   no clubbing, no cyanosis, no edema, no calf pain, swelling or erythema  VASCULAR:  pulses equal and symmetric in the upper and lower extremities  NEURO:  AAOX3, no focal neurological deficits, follows all commands, able to move extremities spontaneously  SKIN:  no ulcers, lesions or rashes      LABS                           8.5    10.35 )-----------( 327      ( 28 Sep 2023 07:12 )             26.6     09-28    137  |  110<H>  |  61<H>  ----------------------------<  95  5.2   |  21<L>  |  2.10<H>    Ca    8.9      28 Sep 2023 07:12      Urinalysis Basic - ( 28 Sep 2023 07:12 )    Color: x / Appearance: x / SG: x / pH: x  Gluc: 95 mg/dL / Ketone: x  / Bili: x / Urobili: x   Blood: x / Protein: x / Nitrite: x   Leuk Esterase: x / RBC: x / WBC x   Sq Epi: x / Non Sq Epi: x / Bacteria: x        < from: US Duplex Venous Lower Ext Complete, Bilateral (09.21.23 @ 19:23) >    FINDINGS:    RIGHT:  Normal compressibility of the RIGHT common femoral,femoral and popliteal   veins.  Doppler examination shows normal spontaneous and phasic flow.  There is occlusive DVT of a branch of the posterior tibial vein.   Peroneal, soleal and gastrocnemius veins are patent.    LEFT:  Normal compressibility ofthe LEFT common femoral, femoral and popliteal   veins.  Doppler examination shows normal spontaneous and phasic flow.  Nonocclusive DVT is seen at the tibial peroneal trunk. There is also   occlusive DVT involving a posterior tibial vein branch and peroneal vein    IMPRESSION:  Bilateral below the knee DVT. Results were discussed with physician   assistant [place] at 8:20 PM on 9/21/2023.    --- End of Report ---    MEGAN FLANNERY MD; Attending Radiologist  This document has been electronically signed. Sep 21 2023  8:18PM    < end of copied text >          < from: CT Abdomen and Pelvis No Cont (09.20.23 @ 21:40) >      IMPRESSION:  Mild left hydronephrosis with perinephric fat stranding and urothelial   thickening. These findings are nonspecific but can be seen in the setting   of pyelonephritis or recently passed stone.    Mild circumferential mural thickening of the urinary bladder, likely   related to chronic outlet obstruction, however superimposed cystitis is   not excluded in the current context.    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    Small bilateral pleural effusions with associated atelectasis.        --- End of Report ---    < end of copied text >

## 2023-09-28 NOTE — DISCHARGE NOTE PROVIDER - NSDCCAREPROVSEEN_GEN_ALL_CORE_FT
Jose, Marcus Wilson, Maxine Coto, Radha Marcus, Eleuterio Quezada, Willy Cardenas, Christine Pulliam, Mary Mehta, Janeen Mckeon

## 2023-09-28 NOTE — DISCHARGE NOTE NURSING/CASE MANAGEMENT/SOCIAL WORK - PATIENT PORTAL LINK FT
You can access the FollowMyHealth Patient Portal offered by [facility] by registering at the following website: [email or web address]. By joining [email or web address]’s FollowMyHealth portal, you will also be able to view your health information using other applications (apps) compatible with our system.

## 2023-09-28 NOTE — DISCHARGE NOTE PROVIDER - NSDCCPCAREPLAN_GEN_ALL_CORE_FT
PRINCIPAL DISCHARGE DIAGNOSIS  Diagnosis: Pyelonephritis  Assessment and Plan of Treatment: A kidney infection, or pyelonephritis, is a bacterial infection. The infection usually starts in your bladder or urethra and moves into your kidney. One or both kidneys may be infected.   - this improved after IV antibiotics  - continue to take CEFEPIME for 2 more weeks. new medication. please follow up with your primary care provider in one week to have your urine rechecked in one week.   - monitor for the following signs/symptoms and contact your primary care provider if they occur Seek care immediately if:   You have a fever and chills. You cannot stop vomiting. You have severe pain in your abdomen, lower back, or sides. you have blood in your urine. You continue to have a fever after you take antibiotics. You have pain when you urinate, even after treatment.Your signs and symptoms return.      SECONDARY DISCHARGE DIAGNOSES  Diagnosis: DVT, lower extremity  Assessment and Plan of Treatment: WHAT IS A DEEP VEIN THROMBOSIS? A deep vein thrombosis (DVT) is a blood clot that forms in a deep vein of the body.  THINGS TO DO: (1) Continue to elevate your legs above the level of your heart when you sit or lie down, as often as you can to help decrease swelling and pain (2) Maintain a healthy weight (3) Change your body position or move around often. Move and stretch in your seat several times each hour if you travel by car or work at a desk (4) Do not smoke - nicotine and other chemicals in cigarettes and cigars can damage blood vessels and make it more difficult to manage your DVT.   MONITOR THESE SIGNS OR SYMPTOMS: (1) Worsening lightheadedness (2) Worsening shortness of breath (3) New or worsening chest pain (4) Your arm or leg feels warm, tender, and swollen. If you experience any of these, DO alert your primary care provider, or return to the Emergency Department if you feel very sick.

## 2023-10-05 DIAGNOSIS — L60.2 ONYCHOGRYPHOSIS: ICD-10-CM

## 2023-10-05 DIAGNOSIS — Z79.899 OTHER LONG TERM (CURRENT) DRUG THERAPY: ICD-10-CM

## 2023-10-05 DIAGNOSIS — I69.354 HEMIPLEGIA AND HEMIPARESIS FOLLOWING CEREBRAL INFARCTION AFFECTING LEFT NON-DOMINANT SIDE: ICD-10-CM

## 2023-10-05 DIAGNOSIS — D63.1 ANEMIA IN CHRONIC KIDNEY DISEASE: ICD-10-CM

## 2023-10-05 DIAGNOSIS — L60.3 NAIL DYSTROPHY: ICD-10-CM

## 2023-10-05 DIAGNOSIS — E03.9 HYPOTHYROIDISM, UNSPECIFIED: ICD-10-CM

## 2023-10-05 DIAGNOSIS — I82.443 ACUTE EMBOLISM AND THROMBOSIS OF TIBIAL VEIN, BILATERAL: ICD-10-CM

## 2023-10-05 DIAGNOSIS — E87.5 HYPERKALEMIA: ICD-10-CM

## 2023-10-05 DIAGNOSIS — N13.30 UNSPECIFIED HYDRONEPHROSIS: ICD-10-CM

## 2023-10-05 DIAGNOSIS — N30.90 CYSTITIS, UNSPECIFIED WITHOUT HEMATURIA: ICD-10-CM

## 2023-10-05 DIAGNOSIS — R33.9 RETENTION OF URINE, UNSPECIFIED: ICD-10-CM

## 2023-10-05 DIAGNOSIS — E43 UNSPECIFIED SEVERE PROTEIN-CALORIE MALNUTRITION: ICD-10-CM

## 2023-10-05 DIAGNOSIS — U07.1 COVID-19: ICD-10-CM

## 2023-10-05 DIAGNOSIS — L89.152 PRESSURE ULCER OF SACRAL REGION, STAGE 2: ICD-10-CM

## 2023-10-05 DIAGNOSIS — N12 TUBULO-INTERSTITIAL NEPHRITIS, NOT SPECIFIED AS ACUTE OR CHRONIC: ICD-10-CM

## 2023-10-05 DIAGNOSIS — N17.9 ACUTE KIDNEY FAILURE, UNSPECIFIED: ICD-10-CM

## 2023-10-05 DIAGNOSIS — I34.0 NONRHEUMATIC MITRAL (VALVE) INSUFFICIENCY: ICD-10-CM

## 2023-10-05 DIAGNOSIS — A41.52 SEPSIS DUE TO PSEUDOMONAS: ICD-10-CM

## 2023-10-05 DIAGNOSIS — E55.9 VITAMIN D DEFICIENCY, UNSPECIFIED: ICD-10-CM

## 2023-10-05 DIAGNOSIS — N18.9 CHRONIC KIDNEY DISEASE, UNSPECIFIED: ICD-10-CM

## 2023-10-05 DIAGNOSIS — I95.9 HYPOTENSION, UNSPECIFIED: ICD-10-CM

## 2023-10-05 DIAGNOSIS — K59.00 CONSTIPATION, UNSPECIFIED: ICD-10-CM

## 2024-09-23 NOTE — DIETITIAN INITIAL EVALUATION ADULT - PROBLEM SELECTOR PROBLEM 2
Chief Complaint   Patient presents with    Video Visit        HPI       Anxiety follow-up.  The patient indicates compliance with medication regimen.  They report adequate control of symptoms and denies any side effects from medication.  Review of systems: Negative for insomnia, anorexia, agitation, confusion, or thoughts of self-harm.    ROS: All other systems negative.    Recent PHQ 2/9 Score    PHQ 2:  PHQ 2 Score Adult PHQ 2 Score Adult PHQ 2 Interpretation Little interest or pleasure in activity?   10/9/2023   1:32 PM 0 No further screening needed 0       PHQ 9:            Past Medical History:   Diagnosis Date    (HFpEF) heart failure with preserved ejection fraction  (CMD) 04/22/2024    CONCLUSIONS:   -Two-dimensional transthoracic echocardiography was performed using standard views & projections with M-mode and Doppler (continuous, pulsed wave, spectral & color flow).   -The left ventricle is normal in size. There is no left ventricular hypertrophy. Left ventricular systolic function is low normal. EF = 54% (2D biplane) Left ventricular diastolic function is consistent with abno    Abnormal EKG 09/15/2014    abnormal P waves, possible left atrial abnormality, marked left axis deviation, Q waves in inferior leads, possible inferior infarct.  Abnormal EKG.    Anxiety disorder     Cervical disc disease 2019    Cervical disc disease, treated November 2019 at [facility] with a steroid injection, and physical therapy.  MRI performed showed herniated disc.    COVID-19 12/30/2020    Elevated blood-pressure reading without diagnosis of hypertension     Erectile dysfunction 05/03/2022    Erectile dysfunction     EL (generalized anxiety disorder)     Medication discontinued October 2022    Hydrocele     Hypercholesterolemia     Impaired fasting glucose     Other elevated white blood cell count 05/03/2022    Pancreas anomaly, congenital     He has hx of abn CAT scan of the pancreas performed in 2013.  He  recently applied for life ins and was denied due to lack of f/u.  As of 4/3/2018 the pt is asyx and his exam is nl.  labs to evaluate the liver and pancreas as well as a abdominal ultrasound were completed, reviewed with patient and stable or wnl.  Due to these results we can supply records for his reaapp to the insurance company prn    Tobacco use 2022    Tubular adenoma of colon         Past Surgical History:   Procedure Laterality Date    Colonoscopy      Dr. Gamino    Colonoscopy  2021    Repeat  Colonoscopy 2021 diverticulosis, 3 polyps, repeat colonoscopy in 5 years. Dr. Barnett [facility].    Echo heart stress  10/13/2014    no exercise induced chest pain.  No EKG evidence of ischemia.  No echocardiographic evidence of ischemia.  Normal left ventricle.    Echocardiogram  2014    mild global left ventricular hypokinesis.  No segmental wall deficits.  Left ventricular ejection fraction 54%.  Reduced left ventricular compliance.  Thickened mitral valve leaflets.  Mild aortic valve sclerosis.  Mild aortic root dilatation is seen at the level of the aortic valve.    Laparoscopic repair recurrent incarcerated inguinal hernia  2023    Dr. Darby    Nasal septum surgery  2024    and polypectomy.  Snoring resolved        Family History   Problem Relation Age of Onset    Cancer, Breast Mother          1994    Coronary Artery Disease Father         Age 60.   secondary to coronary artery disease    Heart disease Sister         One sister living, one  secondary to rare heart and lung disease.    Alcohol Abuse Brother          secondary to bleeding ulcer        Immunization History   Administered Date(s) Administered    COVID [employer] 18Y+ 2021    Influenza A Monovalent (H5N1), Adjuvanted 2013 09/15/2014    Influenza, intradermal, PF 10/15/2013, 10/19/2015    Influenza, recombinant, quadrivalent, egg free, PF 10/10/2022     Novel Influenza [id number], Nasal 10/19/2015    Tdap 09/15/2014        Social History     Socioeconomic History    Marital status: /Civil Union     Spouse name: Not on file    Number of children: Not on file    Years of education: Not on file    Highest education level: Not on file   Occupational History    Not on file   Tobacco Use    Smoking status: Some Days     Types: Cigars    Smokeless tobacco: Never   Substance and Sexual Activity    Alcohol use: Yes     Alcohol/week: 10.0 standard drinks of alcohol     Types: 10 Standard drinks or equivalent per week    Drug use: Yes    Sexual activity: Yes     Partners: Female   Other Topics Concern    Not on file   Social History Narrative    Social History Comments: Living situation: 1 dog and WIFE, no kids     Occupation: Owner of company for [employer]     Alcohol ( Drinks per week): 25-30 alcoholic beverages per week on average    Rec. drugs: Denies     Diet: Healthy, well balanced. Paleo diet since 1/2017 and whey protein supplement post work outs.     Aerobic exercise (workouts per week): 4 days with weights and increased cardio, orange theory 3x/week and hockey 1x/week. Denies exercise intolerance.        ===    [facility]    CT CARDIAC CORONARY ANGIOGRAPHY W CALCIUM    IMPRESSION:      1.  Mild calcific atherosclerotic plaque causing mild stenosis (20-30%) in the proximal left anterior descending artery .    2.  Total calcium score is 142. The observed calcium score is at the 90th percentile for subjects of the same age, gender, and race/ethnicity who are free of clinical cardiovascular disease and treated diabetes    Michael Parks MD    10/26/23         The test we ordered to evaluate for heart failure is negative.  This would indicate there is not significant heart failure at this time despite the reduced function of your heart.  Proceed with cardiology consultation as planned.  As long as you continue  to feel well there is no urgency to this consultation.    Results: ProBNP 20    Michael Parks MD    10/10/23              Echocardiogram    Normal left ventricular size and thickness. Mild global hypokinesis. Mildly     reduced left ventricular systolic function with calculated ejection fraction     of 49%. The above was confirmed with injection of echocardiographic     contrast, which also demonstrated no intraventricular thrombus.     Normal right ventricular size and function.     Normal valvular function.     Grade I diastolic dysfunction.     No pulmonary hypertension.     No significant pericardial effusion.     Compared to prior echocardiogram report on 9/16/2014, left ventricular     function may be slightly decreased.     Michael Parks MD    09/14/23          Laboratory studies are adequate for surgical clearance.  Final surgical clearance remains pending results of echocardiogram.  Please proceed with the ordered echocardiogram as soon as possible.  I will need to review these results prior to releasing you for surgery.    Michael Parks MD    08/18/23          Normal prostate-specific antigen indicates low risk for prostate cancer.  Mild elevation of LDL cholesterol should be treated with diet exercise and low saturated fat diet.  Elevated fasting blood sugar is at risk for prediabetes and diabetes.  Testosterone levels are in the normal range albeit at the lower end of normal.  Consider follow-up for repeat testing early in the morning if symptoms persisting.    Glucose 116, GFR greater than 90, , PSA 0.97, testosterone 372.    Michael Campos MD    6/28/2023        Lab results show elevated cholesterol but no other abnormalities. . Continue diet low in saturated fats, sugar, and fried foods and regular exercise. No medication indicated at this time. PSA 0.73 and low risk of prostate cancer at this time. Normal fasting glucose, liver and kidney function.      Omari, DANA SERGEY JOANNA 03/10/2022        Colonoscopy    A.  Ascending colon, polyp, polypectomy:Tubular adenoma.       B.  Transverse colon, polyp, polypectomy: Tubular adenoma.       C.  Rectum, polyp, polypectomy: Tubular adenoma.       10/26/2021          Colonoscopy 8/25/2021 diverticulosis, 3 polyps, repeat colonoscopy in 5 years. Dr. Barnett [facility]. VENKAT BUITRAGO 10/29/2021        Laboratory results show mild elevation of cholesterol and elevated fasting blood sugar without prediabetes. Continuing and/or improving diet and exercise weight control efforts will help reduce risk associated with elevated blood sugar and mild high cholesterol. Medication not indicated at this time. Follow-up in 6 months as planned. Results: , A1c 5.2, glucose 110. VENKAT BUITRAGO 04/13/2021        Laboratory results show moderate high cholesterol, normal prostate-specific antigen, and elevated fasting glucose. Hi cholesterol and elevated fasting glucose are risk factors for heart attack stroke and prediabetes. Improved diet exercise and weight loss efforts will help reduce this risk. Otherwise, continue current management and follow-up plans. Results: , PSA 1.49, hemoglobin A1c 5.0, glucose 102 VENKAT BARNETTJ LUIS 08/19/2020        Laboratory results are within normal limits or well controlled. Mild elevation of LDL cholesterol can be improved through diet exercise and weight loss. Continue present management. Results: Glucose 94 hemoglobin A1c 5.2 PSA 1.6 . General health panel otherwise normal. VENKAT BUITRAGO 07/10/2019        5/1/2019-glucose 112 creatinine 0.89  A1c 4.9. General health panel otherwise normal.    4/4/2018 glucose 101. CMP amylase lipase normal        7/7/2016  glucose 101. General health panel otherwise normal.        Stress ECHO negative, Began: 10/01/2014        Echocardiogram    Conclusions / Summary     The current Echodoppler examination  was of good technical quality.         The left ventricle is normal in size.     Mild global LV hypokinesia is noted. No definite segmental LV wall motion     abnormalities are seen.     LV wall thickness is at the upper range of normal.     The LVEF=54%.     The pulsed doppler inflow pattern in diastole across the mitral valve is     suggestive of a reduction in LV compliance.     The left atrium is normal in size.         The quality of the echocardiogram is not optimal, but the right ventricle     appears to be normal in size and contractility.     The right atrium is normal in size.         Mild aortic valve sclerosis is present with good valvular opening.     The aortic valve is trileaflet in configuration.     No aortic valvular dysfunction is seen on doppler imaging.         The mitral valve leaflets appear thickened, but open well.     The mitral valve annulus is normal in appearance.     A trace degree of mitral insufficiency is seen on doppler imaging.         The tricuspid valve is not well visualized, but is probably normal.     A trace degree of tricuspid insufficiency is seen on doppler imaging.     Based upon the velocity of the tricuspid regurgitant jet, no elevation in     pulmonary artery pressure is suggested.     9/16/2014         Social Determinants of Health     Financial Resource Strain: Not on file   Food Insecurity: Low Risk  (9/20/2024)    Food Insecurity     Worried about Food: Never true     Food is Gone: Never true   Transportation Needs: Not At Risk (9/20/2024)    Transportation Needs     Lack of Reliable Transportation: No   Physical Activity: Not on file   Stress: Not on file   Social Connections: Not on file   Interpersonal Safety: Not on file       PHYSICAL EXAM     There were no vitals taken for this visit.     Physical Exam     Current Outpatient Medications   Medication Sig Dispense Refill    fluticasone (FLONASE) 50 MCG/ACT nasal spray 2 SPRAYS IN EACH NOSTRIL TWICE DAILY       CVA (cerebrovascular accident) tadalafil (CIALIS) 20 MG tablet Take 1 tablet by mouth daily as needed for Erectile Dysfunction. 10 tablet 11    buPROPion XL (WELLBUTRIN XL) 150 MG 24 hr tablet Take 1 tablet by mouth daily. 90 tablet 1    ibuprofen (MOTRIN) 600 MG tablet Take 600 mg by mouth every 8 hours. FOR 7 DAYS      fexofenadine (ALLEGRA) 180 MG tablet Take 180 mg by mouth daily.       No current facility-administered medications for this visit.          ASSESSMENT AND PLAN    1. EL (generalized anxiety disorder)  Patient is tolerating medication without side effects and symptoms are well controlled.  Continue present management and return for therapeutic drug monitoring in 6 months.     2. Erectile dysfunction, unspecified erectile dysfunction type  Patient is tolerating medication without side effects and symptoms are well controlled.  Continue present management and return for therapeutic drug monitoring in 6 months.     3. Chronic heart failure with preserved ejection fraction  (CMD)  He reports he saw a heart failure specialist at [facility] in April 2024.  His echocardiogram at that time showed stable to slightly improved left ventricular function in the borderline low range.  He continues to be asymptomatic and has excellent exercise tolerance and works out regularly.  He was advised to get in contact with a general cardiologist for monitoring, but he has not yet made this appointment.  I advised him to follow-up with a general cardiologist of his own choosing at the earliest opportunity.  He should monitor for new or worse symptoms such as dyspnea or chest pain.  The reason for his reduced ejection fraction is unclear, perhaps there was some insult at some unknown time in the past such as a viral carditis or something similar and what we are seeing is the end result.  However this is only conjecture.  Regardless, he is doing well and we should continue routine monitoring and risk factor modification.      Follow-up for a wellness  exam in the near future.  He needs to be seen for medication management within the next 6 months.    20 minutes was spent with patient today for counseling and coordination of care, reviewing medical record, preparing for visit, and preparing documentation.

## 2024-10-17 NOTE — ED ADULT NURSE NOTE - CHIEF COMPLAINT QUOTE
Estimated Blood Loss (Cc): minimal Pt presents to ED c/o abdominal pain, arrives w/ midline to left arm- on antibiotics for pseudomonas and bacteremia started on 9/13. Pt c/o subjective fevers.